# Patient Record
Sex: FEMALE | Race: ASIAN | NOT HISPANIC OR LATINO | Employment: OTHER | URBAN - METROPOLITAN AREA
[De-identification: names, ages, dates, MRNs, and addresses within clinical notes are randomized per-mention and may not be internally consistent; named-entity substitution may affect disease eponyms.]

---

## 2017-10-18 ENCOUNTER — TRANSFERRED RECORDS (OUTPATIENT)
Dept: HEALTH INFORMATION MANAGEMENT | Facility: CLINIC | Age: 32
End: 2017-10-18

## 2020-04-19 ENCOUNTER — TRANSFERRED RECORDS (OUTPATIENT)
Dept: HEALTH INFORMATION MANAGEMENT | Facility: CLINIC | Age: 35
End: 2020-04-19

## 2020-04-19 LAB
ALT SERPL-CCNC: 51 U/L (ref 12–78)
AST SERPL-CCNC: 23 U/L (ref 15–37)
CREATININE (EXTERNAL): 0.63 MG/DL (ref 0.55–1.02)
GFR ESTIMATED (EXTERNAL): >60 ML/MIN (ref 60–139)
GLUCOSE (EXTERNAL): 91 MG/DL (ref 74–106)
POTASSIUM (EXTERNAL): 3.4 MMOL/L (ref 3.5–5.1)

## 2020-04-24 ENCOUNTER — TRANSFERRED RECORDS (OUTPATIENT)
Dept: HEALTH INFORMATION MANAGEMENT | Facility: CLINIC | Age: 35
End: 2020-04-24

## 2020-04-30 LAB
CREATININE (EXTERNAL): 0.7 MG/DL (ref 0.55–1.02)
GFR ESTIMATED (EXTERNAL): >60 ML/MIN (ref 60–139)
GLUCOSE (EXTERNAL): 92 MG/DL (ref 74–106)
POTASSIUM (EXTERNAL): 4.1 MMOL/L (ref 3.5–5.1)

## 2020-12-30 ENCOUNTER — OFFICE VISIT - HEALTHEAST (OUTPATIENT)
Dept: FAMILY MEDICINE | Facility: CLINIC | Age: 35
End: 2020-12-30

## 2020-12-30 DIAGNOSIS — Z00.00 ANNUAL PHYSICAL EXAM: ICD-10-CM

## 2020-12-30 DIAGNOSIS — Z91.89 NEED FOR DENTAL CARE: ICD-10-CM

## 2020-12-30 DIAGNOSIS — Z23 NEED FOR IMMUNIZATION AGAINST INFLUENZA: ICD-10-CM

## 2020-12-30 DIAGNOSIS — Z28.39 INCOMPLETE IMMUNIZATION STATUS: ICD-10-CM

## 2020-12-30 DIAGNOSIS — Z76.89 ENCOUNTER TO ESTABLISH CARE: ICD-10-CM

## 2020-12-30 ASSESSMENT — MIFFLIN-ST. JEOR: SCORE: 1240.09

## 2021-06-05 VITALS
OXYGEN SATURATION: 99 % | SYSTOLIC BLOOD PRESSURE: 100 MMHG | BODY MASS INDEX: 26.6 KG/M2 | TEMPERATURE: 97.8 F | HEIGHT: 60 IN | RESPIRATION RATE: 16 BRPM | DIASTOLIC BLOOD PRESSURE: 62 MMHG | HEART RATE: 86 BPM | WEIGHT: 135.5 LBS

## 2021-06-14 NOTE — PROGRESS NOTES
"FEMALE PREVENTATIVE EXAM    Assessment and Plan:   Patient has been advised of split billing requirements and indicates understanding: Yes    Encounter to [facility] care  Born in [facility], moved to [facility] [place], then to [place] about 7 years ago, then [place] in . No health concerns, but describes a possible ulcer? RIP signed.     Annual physical exam  Waiting for medical records    Need for immunization against influenza  - Influenza, Seasonal Quad, PF =/> 6months    Incomplete immunization status, RIP signed.     Need for dental care  - Ambulatory referral to Dentistry     Next follow up:  Return in about 1 year (around 2021) for Annual physical.    Immunization Review  Adult Imm Review: Unknown status, attempting to get records    I discussed the following with the patient:   Adult Healthy Living: Importance of regular exercise  Healthy nutrition      Subjective:   Chief Complaint: Alexander Alvarez is an 35 y.o. female here for a preventative health visit.    Patient has been advised of split billing requirements and indicates understanding: Yes  HPI:  Intestine sore, food can cause abscess and may need surgery in the future.   She does not take any meds for this.    2 children, youngest was  because \"baby's head was angled wrong.\"   No medications.   No known family history.   No surgeries other than .   Never smoker, no smokeless tobacco, no betel nut.     Healthy Habits  Are you taking a daily aspirin? No  Do you typically exercising at least 40 min, 3-4 times per week?  Yes  Do you usually eat at least 4 servings of fruit and vegetables a day, include whole grains and fiber and avoid regularly eating high fat foods? Yes  Have you had an eye exam in the past two years? Yes  Do you see a dentist twice per year? Yes  Do you have any concerns regarding sleep? No    Safety Screen  If you own firearms, are they secured in a locked gun cabinet or with trigger locks? The patient does not own " "any firearms  Do you feel you are safe where you are living?: Yes (12/30/2020  8:20 AM)  Do you feel you are safe in your relationship(s)?: Yes (12/30/2020  8:20 AM)      Review of Systems:  Please see above.  The rest of the review of systems are negative for all systems.     Attempting to get records, but may have been 5 years ago.   Cancer Screening       Status Date      PAP SMEAR Overdue 1/1/2008           Patient Care Team:  Mary River MD as PCP - General (Family Medicine)        History     Reviewed By Date/Time Sections Reviewed    Mary River MD 12/30/2020 10:07 AM Medical, Surgical, Tobacco, Family    Samreen Anderson, JUSTIN 12/30/2020  8:20 AM Tobacco, Alcohol, Drug Use            Objective:   Vital Signs:   Visit Vitals  /62   Pulse 86   Temp 97.8  F (36.6  C) (Oral)   Resp 16   Ht 5' 0.25\" (1.53 m)   Wt 135 lb 8 oz (61.5 kg)   LMP 11/23/2020 (Exact Date)   SpO2 99%   Breastfeeding No   BMI 26.24 kg/m           PHYSICAL EXAM  General: Alert and oriented, in NAD.  Eyes: PERRL, EOMI, sclera normal.  HENT: Normocephalic, no pharyngeal erythema, MMM.  Neck: Supple, no adenopathy.  Heart: Normal S1 and S2, regular rhythm. No murmurs, gallops, rubs.  Lungs: CTA bilaterally, no wheezes, no crackles, no rhonchi.  Abdomen: Soft, non-tender, non-distended, BS+.   MSK: Normal ROM of upper and lower extremities.  Neuro: Alert and oriented x 3. Moves all extremities. No focal deficits noted.  Extremities: Peripheral pulses intact, no peripheral edema.  Skin: Warm and well perfused, no rashes noted.  Psych: Normal mood and affect.        Additional Screenings Completed Today:   None indicated. See assessment/plan    ========================================  Visit was completed along with Cynthia , patient's  and 2 children    Options for treatment and follow-up care were reviewed with the patient. Mu Kim and/or guardian was engaged and actively involved in the decision making process. Mu " Kim and/or guardian verbalized understanding of the options discussed and was satisfied with the final plan.    Mary River MD

## 2021-09-01 ENCOUNTER — OFFICE VISIT (OUTPATIENT)
Dept: FAMILY MEDICINE | Facility: CLINIC | Age: 36
End: 2021-09-01
Payer: COMMERCIAL

## 2021-09-01 VITALS
WEIGHT: 147 LBS | HEART RATE: 72 BPM | SYSTOLIC BLOOD PRESSURE: 111 MMHG | TEMPERATURE: 98.2 F | RESPIRATION RATE: 16 BRPM | DIASTOLIC BLOOD PRESSURE: 74 MMHG | OXYGEN SATURATION: 99 %

## 2021-09-01 DIAGNOSIS — N39.0 URINARY TRACT INFECTION WITHOUT HEMATURIA, SITE UNSPECIFIED: Primary | ICD-10-CM

## 2021-09-01 DIAGNOSIS — R35.0 URINARY FREQUENCY: ICD-10-CM

## 2021-09-01 DIAGNOSIS — N92.6 MISSED PERIOD: ICD-10-CM

## 2021-09-01 DIAGNOSIS — Z33.1 PREGNANCY, INCIDENTAL: ICD-10-CM

## 2021-09-01 LAB
ALBUMIN UR-MCNC: NEGATIVE MG/DL
APPEARANCE UR: CLEAR
BACTERIA #/AREA URNS HPF: ABNORMAL /HPF
BILIRUB UR QL STRIP: NEGATIVE
COLOR UR AUTO: YELLOW
GLUCOSE UR STRIP-MCNC: NEGATIVE MG/DL
HCG UR QL: POSITIVE
HGB UR QL STRIP: NEGATIVE
KETONES UR STRIP-MCNC: 15 MG/DL
LEUKOCYTE ESTERASE UR QL STRIP: ABNORMAL
NITRATE UR QL: NEGATIVE
PH UR STRIP: 8.5 [PH] (ref 5–8)
RBC #/AREA URNS AUTO: ABNORMAL /HPF
SP GR UR STRIP: 1.01 (ref 1–1.03)
SQUAMOUS #/AREA URNS AUTO: ABNORMAL /LPF
UROBILINOGEN UR STRIP-ACNC: 0.2 E.U./DL
WBC #/AREA URNS AUTO: ABNORMAL /HPF

## 2021-09-01 PROCEDURE — 99204 OFFICE O/P NEW MOD 45 MIN: CPT | Performed by: PHYSICIAN ASSISTANT

## 2021-09-01 PROCEDURE — 81001 URINALYSIS AUTO W/SCOPE: CPT | Performed by: PHYSICIAN ASSISTANT

## 2021-09-01 PROCEDURE — 81025 URINE PREGNANCY TEST: CPT | Performed by: PHYSICIAN ASSISTANT

## 2021-09-01 RX ORDER — CEPHALEXIN 500 MG/1
500 CAPSULE ORAL 2 TIMES DAILY
Qty: 20 CAPSULE | Refills: 0 | Status: SHIPPED | OUTPATIENT
Start: 2021-09-01 | End: 2021-09-11

## 2021-09-01 NOTE — PROGRESS NOTES
Patient presents with:  Urinary Frequency: URINARY FREQUENCY, LOWER BACK PAIN, DARK COLORED URINE, POSSIBLE FEVERS AT NIGHT TIME. HAS NOT HAD A PERIOD SINCE JUNE, POSITIVE HOME PREGNANCY TEST IN AUGUST.       Clinical Decision Making:  Advised the patient that she did have a positive pregnancy test.  She will start a prenatal vitamin.  Intake with nurse midwife establish care for pregnancy.  Patient does have symptomatic bacteria on the UA and since she is pregnant she is treated for the symptomatic bacteria.  Urine will be sent for culture. Expected course of resolution and indication for return was gone over and questions were answered to patient/parent's satisfaction before discharge.    35 min spent on the date of the encounter in chart review, patient visit, review of tests, documentation and/ordiscussion with other providers about the issues documented above.  Extra time was spent with Cynthia .        ICD-10-CM    1. Urinary tract infection without hematuria, site unspecified  N39.0 cephALEXin (KEFLEX) 500 MG capsule   2. Urinary frequency  R35.0 UA with Microscopic reflex to Culture - Clinic Collect     Urine Microscopic   3. Missed period  N92.6 HCG Qual, Urine ([id number])     HCG Qual, Urine ([id number])   4. Pregnancy, incidental  Z33.1 Midwifery (Certified Nurse Midwife) Amb Referral       Patient Instructions   Take prenatal vitamin for healthy pregnancy.  Establish care with OB/GYN.      Patient Education     Pregnancy    Your exam today shows that you are pregnant.  Pregnancy symptoms  During pregnancy your body s hormones change. This causes physical and emotional changes. This is normal. Knowing what to expect is important for your piece of mind and so you know when to seek help for a problem. Here are some of the most common symptoms:     Morning sickness or nausea. This can happen any time of the day or night.    Tender, swollen breasts    Need to urinate frequently    Tiredness or  fatigue    Dizziness    Indigestion or heartburn    Food cravings or turn-offs    Constipation    Emotional changes. This can range from anxiety to excitement to depression.  General care for a healthy pregnancy  Here are things you can do to help make sure your baby is born healthy:    Rest when you feel tired. This is especially true in the later months of pregnancy.    Drink more fluids. Your body needs more fluids than you may be used to. Drink 8 to10 glasses of juice, milk, or water every day.    Eat well-balanced meals. Eat at regular times to give your body enough protein. You can expect to gain about 30 pounds during the pregnancy. Don t try to diet or lose weight while you are pregnant.    Take a prenatal vitamin every day. This helps you meet the extra nutritional needs of pregnancy.    Don t take any other medicine during your pregnancy unless your healthcare provider tells you to. This includes prescription medicines and those you buy over the counter. Many medicines can harm the growing baby.    If you have nausea or vomiting, don t eat greasy or fried foods. Eat several smaller meals throughout the day rather than 3 large meals.    If you smoke, you must stop. The nicotine you breathe in goes right to the baby.    Stay away from alcohol, even in moderate amounts. Daily drinking will harm your baby and can cause permanent brain damage.    Don t use recreational drugs, especially cocaine, crack, and heroin. These will harm your baby. Also avoid marijuana.    If you were using recreational drugs or prescribed medicine when you found out that you were pregnant, talk with your healthcare provider about possible effects on your growing baby.    If you have medical problems that you need to take medicine for, talk with your healthcare provider.  Follow-up care  Call your healthcare provider to arrange for prenatal care. Prenatal care is important. You can see your family provider, a pregnancy specialist  (obstetrician), a midwife, or a primary care clinic.   When to seek medical advice  Call your healthcare provider right away if any of these occur:    Vaginal bleeding    Pain in your belly (abdomen) or back that is moderate or severe    Lots of vomiting, or you can t keep any fluids down for 6 hours    Burning feeling when you urinate    Headache, dizziness, or rapid weight gain    Fever    Vision changes or blurred vision  [employer] last reviewed this educational content on 11/1/2019 2000-2021 [employer]. All rights reserved. This information is not intended as a substitute for professional medical care. Always follow your healthcare professional's instructions.         Increased fluids and rest.  Discussed signs and symptoms of ascending urinary tract infection symptoms to include pyelonephritis. Instructed to turn to clinic if there are increased fever chills night sweats fatigue abdominal pain or flank pain  Antibiotic as written. Risks and benefits of medication discussed.  Indication for return to clinic.        Urinary Tract Infections in Women    Urinary tract infections (UTIs) are most often caused by bacteria (germs). These bacteria enter the urinary tract. The bacteria may come from outside the body. Or they may travel from the skin outside the rectum or vagina into the urethra. Female anatomy makes it easier for bacteria from the bowel to enter a woman s urinary tract, which is the most common source of UTI. This means women develop UTIs more often than men. Pain in or around the urinary tract is a common UTI symptom. But the only way to know for sure if you have a UTI for the health care provider to test your urine. The two tests that may be done are the urinalysis and urine culture.  Types of UTIs    Cystitis: A bladder infection (cystitis) is the most common UTI in women. You may have urgent or frequent urination. You may also have pain, burning when you urinate, and bloody  urine.    Urethritis: This is an inflamed urethra, which is the tube that carries urine from the bladder to outside the body. You may have lower stomach or back pain. You may also have urgent or frequent urination.    Pyelonephritis: This is a kidney infection. If not treated, it can be serious and damage your kidneys. In severe cases, you may be hospitalized. You may have a fever and lower back pain.  Medications to treat a UTI  Most UTIs are treated with antibiotics. These kill the bacteria. The length of time you need to take them depends on the type of infection. It may be as short as 3 days. If you have repeated UTIs, a low-dose antibiotic may be needed for several months. Take antibiotics exactly as directed. Don t stop taking them until all of the medication is gone. If you stop taking the antibiotic too soon, the infection may not go away, and you may develop a resistance to the antibiotic. This can make it much harder to treat.  Lifestyle changes to treat and prevent UTIs  The lifestyle changes below will help get rid of your UTI. They may also help prevent future UTIs.    Drink plenty of fluids. This includes water, juice, or other caffeine-free drinks. Fluids help flush bacteria out of your body.    Empty your bladder. Always empty your bladder when you feel the urge to urinate. And always urinate before going to sleep. Urine that stays in your bladder can lead to infection. Try to urinate before and after sex as well.    Practice good personal hygiene. Wipe yourself from front to back after using the toilet. This helps keep bacteria from getting into the urethra.    Use condoms during sex. These help prevent UTIs caused by sexually transmitted bacteria. Also, avoid using spermicides during sex. These can increase the risk of UTIs. Choose other forms of birth control instead. For women who tend to get UTIs after sex, a low-dose of a preventive antibiotic may be used. Be sure to discuss this option with  your health care provider.    Follow up with your health care provider as directed. He or she may test to make sure the infection has cleared. If necessary, additional treatment may be started.  Date Last Reviewed: 9/8/2014 2000-2016 [employer]. [address] [place] [id number]. All rights reserved. This information is not intended as a substitute for professional medical care. Always follow your healthcare professional's instructions.                          HPI:  Alexander Verdugo is a 36 year old female who presents today for evaluation of urinary symptoms to include urinary frequency urgency hesitancy burning or dysuria slight flank pain bilaterally but no gross hematuria no vaginal discharge.  Patient also is requesting a urinary pregnancy test since she missed her last menstrual period.     History obtained from chart review and the patient.    Problem List:  There are no relevant problems documented for this patient.      No past medical history on file.    Social History     Tobacco Use     Smoking status: Never Smoker     Smokeless tobacco: Never Used   Substance Use Topics     Alcohol use: Not on file       Review of Systems  As above in HPI.    Vitals:    09/01/21 1101   BP: 111/74   BP Location: Right arm   Patient Position: Sitting   Cuff Size: Adult Regular   Pulse: 72   Resp: 16   Temp: 98.2  F (36.8  C)   TempSrc: Oral   SpO2: 99%   Weight: 66.7 kg (147 lb)     General: Patient is resting comfortably no acute distress is afebrile  HEENT: Head is normocephalic atraumatic   eyes are PERRL EOMI sclera anicteric   TMs are clear bilaterally  Throat is with mild pharyngeal wall erythema and no exudate  No cervical lymphadenopathy present  LUNGS: Clear to auscultation bilaterally  HEART: Regular rate and rhythm  Abdomen: Nontender nondistended slight suprapubic tenderness which does reproduce her sense to urinate no CVA tenderness to percussion.  Skin: Without rash non-diaphoretic skin is  without rash nondiaphoretic capillary refill less than 2 seconds.      Physical Exam    Labs:  Results for orders placed or performed in visit on 09/01/21   UA with Microscopic reflex to Culture - Clinic Collect     Status: Abnormal    Specimen: Urine, Clean Catch   Result Value Ref Range    Color Urine Yellow Colorless, Straw, Light Yellow, Yellow    Appearance Urine Clear Clear    Glucose Urine Negative Negative mg/dL    Bilirubin Urine Negative Negative    Ketones Urine 15  (A) Negative mg/dL    Specific Gravity Urine 1.015 1.005 - 1.030    Blood Urine Negative Negative    pH Urine 8.5 (H) 5.0 - 8.0    Protein Albumin Urine Negative Negative mg/dL    Urobilinogen Urine 0.2 0.2, 1.0 E.U./dL    Nitrite Urine Negative Negative    Leukocyte Esterase Urine Small (A) Negative   HCG Qual, Urine ([id number])     Status: Abnormal   Result Value Ref Range    hCG Urine Qualitative Positive (A) Negative   Urine Microscopic     Status: Abnormal   Result Value Ref Range    Bacteria Urine Few (A) None Seen /HPF    RBC Urine None Seen 0-2 /HPF /HPF    WBC Urine 0-5 0-5 /HPF /HPF    Squamous Epithelials Urine Moderate (A) None Seen /LPF    Narrative    Urine Culture not indicated         At the end of the encounter, I discussed results, diagnosis, medications. Discussed red flags for immediate return to clinic/ER, as well as indications for follow up if no improvement. Patient understood and agreed to plan. Patient was stable for discharge.

## 2021-09-01 NOTE — PATIENT INSTRUCTIONS
Take prenatal vitamin for healthy pregnancy.  Establish care with OB/GYN.      Patient Education     Pregnancy    Your exam today shows that you are pregnant.  Pregnancy symptoms  During pregnancy your body s hormones change. This causes physical and emotional changes. This is normal. Knowing what to expect is important for your piece of mind and so you know when to seek help for a problem. Here are some of the most common symptoms:     Morning sickness or nausea. This can happen any time of the day or night.    Tender, swollen breasts    Need to urinate frequently    Tiredness or fatigue    Dizziness    Indigestion or heartburn    Food cravings or turn-offs    Constipation    Emotional changes. This can range from anxiety to excitement to depression.  General care for a healthy pregnancy  Here are things you can do to help make sure your baby is born healthy:    Rest when you feel tired. This is especially true in the later months of pregnancy.    Drink more fluids. Your body needs more fluids than you may be used to. Drink 8 to10 glasses of juice, milk, or water every day.    Eat well-balanced meals. Eat at regular times to give your body enough protein. You can expect to gain about 30 pounds during the pregnancy. Don t try to diet or lose weight while you are pregnant.    Take a prenatal vitamin every day. This helps you meet the extra nutritional needs of pregnancy.    Don t take any other medicine during your pregnancy unless your healthcare provider tells you to. This includes prescription medicines and those you buy over the counter. Many medicines can harm the growing baby.    If you have nausea or vomiting, don t eat greasy or fried foods. Eat several smaller meals throughout the day rather than 3 large meals.    If you smoke, you must stop. The nicotine you breathe in goes right to the baby.    Stay away from alcohol, even in moderate amounts. Daily drinking will harm your baby and can cause permanent  brain damage.    Don t use recreational drugs, especially cocaine, crack, and heroin. These will harm your baby. Also avoid marijuana.    If you were using recreational drugs or prescribed medicine when you found out that you were pregnant, talk with your healthcare provider about possible effects on your growing baby.    If you have medical problems that you need to take medicine for, talk with your healthcare provider.  Follow-up care  Call your healthcare provider to arrange for prenatal care. Prenatal care is important. You can see your family provider, a pregnancy specialist (obstetrician), a midwife, or a primary care clinic.   When to seek medical advice  Call your healthcare provider right away if any of these occur:    Vaginal bleeding    Pain in your belly (abdomen) or back that is moderate or severe    Lots of vomiting, or you can t keep any fluids down for 6 hours    Burning feeling when you urinate    Headache, dizziness, or rapid weight gain    Fever    Vision changes or blurred vision  [employer] last reviewed this educational content on 11/1/2019 2000-2021 [employer]. All rights reserved. This information is not intended as a substitute for professional medical care. Always follow your healthcare professional's instructions.         Increased fluids and rest.  Discussed signs and symptoms of ascending urinary tract infection symptoms to include pyelonephritis. Instructed to turn to clinic if there are increased fever chills night sweats fatigue abdominal pain or flank pain  Antibiotic as written. Risks and benefits of medication discussed.  Indication for return to clinic.        Urinary Tract Infections in Women    Urinary tract infections (UTIs) are most often caused by bacteria (germs). These bacteria enter the urinary tract. The bacteria may come from outside the body. Or they may travel from the skin outside the rectum or vagina into the urethra. Female anatomy makes it easier  for bacteria from the bowel to enter a woman s urinary tract, which is the most common source of UTI. This means women develop UTIs more often than men. Pain in or around the urinary tract is a common UTI symptom. But the only way to know for sure if you have a UTI for the health care provider to test your urine. The two tests that may be done are the urinalysis and urine culture.  Types of UTIs    Cystitis: A bladder infection (cystitis) is the most common UTI in women. You may have urgent or frequent urination. You may also have pain, burning when you urinate, and bloody urine.    Urethritis: This is an inflamed urethra, which is the tube that carries urine from the bladder to outside the body. You may have lower stomach or back pain. You may also have urgent or frequent urination.    Pyelonephritis: This is a kidney infection. If not treated, it can be serious and damage your kidneys. In severe cases, you may be hospitalized. You may have a fever and lower back pain.  Medications to treat a UTI  Most UTIs are treated with antibiotics. These kill the bacteria. The length of time you need to take them depends on the type of infection. It may be as short as 3 days. If you have repeated UTIs, a low-dose antibiotic may be needed for several months. Take antibiotics exactly as directed. Don t stop taking them until all of the medication is gone. If you stop taking the antibiotic too soon, the infection may not go away, and you may develop a resistance to the antibiotic. This can make it much harder to treat.  Lifestyle changes to treat and prevent UTIs  The lifestyle changes below will help get rid of your UTI. They may also help prevent future UTIs.    Drink plenty of fluids. This includes water, juice, or other caffeine-free drinks. Fluids help flush bacteria out of your body.    Empty your bladder. Always empty your bladder when you feel the urge to urinate. And always urinate before going to sleep. Urine that stays  in your bladder can lead to infection. Try to urinate before and after sex as well.    Practice good personal hygiene. Wipe yourself from front to back after using the toilet. This helps keep bacteria from getting into the urethra.    Use condoms during sex. These help prevent UTIs caused by sexually transmitted bacteria. Also, avoid using spermicides during sex. These can increase the risk of UTIs. Choose other forms of birth control instead. For women who tend to get UTIs after sex, a low-dose of a preventive antibiotic may be used. Be sure to discuss this option with your health care provider.    Follow up with your health care provider as directed. He or she may test to make sure the infection has cleared. If necessary, additional treatment may be started.  Date Last Reviewed: 9/8/2014 2000-2016 [employer] [address] [place] [address], [place] [address]. All rights reserved. This information is not intended as a substitute for professional medical care. Always follow your healthcare professional's instructions.

## 2021-10-04 ENCOUNTER — PRENATAL OFFICE VISIT (OUTPATIENT)
Dept: FAMILY MEDICINE | Facility: CLINIC | Age: 36
End: 2021-10-04
Payer: COMMERCIAL

## 2021-10-04 VITALS
DIASTOLIC BLOOD PRESSURE: 60 MMHG | WEIGHT: 139 LBS | TEMPERATURE: 98.2 F | HEART RATE: 75 BPM | OXYGEN SATURATION: 99 % | RESPIRATION RATE: 16 BRPM | SYSTOLIC BLOOD PRESSURE: 110 MMHG

## 2021-10-04 DIAGNOSIS — Z34.80 PRENATAL CARE, SUBSEQUENT PREGNANCY, UNSPECIFIED TRIMESTER: Primary | ICD-10-CM

## 2021-10-04 DIAGNOSIS — Z23 NEED FOR IMMUNIZATION AGAINST INFLUENZA: ICD-10-CM

## 2021-10-04 DIAGNOSIS — G47.00 INSOMNIA, UNSPECIFIED TYPE: ICD-10-CM

## 2021-10-04 LAB
ABO/RH(D): NORMAL
AMPHETAMINE-CLINIC: ABNORMAL
ANTIBODY SCREEN: NEGATIVE
BARBITURATES-CLINIC: ABNORMAL
BENZODIAZEPINES-CLINIC: ABNORMAL
BUPRENORPHINE-CLINIC: ABNORMAL
COCAINE-CLINIC: ABNORMAL
ECSTASY-CLINIC: ABNORMAL
ERYTHROCYTE [DISTWIDTH] IN BLOOD BY AUTOMATED COUNT: 13.4 % (ref 10–15)
HCT VFR BLD AUTO: 36.5 % (ref 35–47)
HGB BLD-MCNC: 12 G/DL (ref 11.7–15.7)
HIV 1+2 AB+HIV1 P24 AG SERPL QL IA: NEGATIVE
MARIJUANA-CLINIC: ABNORMAL
MCH RBC QN AUTO: 29.1 PG (ref 26.5–33)
MCHC RBC AUTO-ENTMCNC: 32.9 G/DL (ref 31.5–36.5)
MCV RBC AUTO: 89 FL (ref 78–100)
METHADONE METABOLITE-CLINIC: ABNORMAL
METHAMPHETAMINE-CLINIC: ABNORMAL
OPIATES-CLINIC: ABNORMAL
OXIDANTS: NORMAL
OXYCODONE-CLINIC: ABNORMAL
PCP 25-CLINIC: ABNORMAL
PH-CLINIC: 4 (ref 5–8)
PLATELET # BLD AUTO: 235 10E3/UL (ref 150–450)
RBC # BLD AUTO: 4.12 10E6/UL (ref 3.8–5.2)
SP GR UR STRIP: 1 (ref 1–1.03)
SPECIMEN EXPIRATION DATE: NORMAL
WBC # BLD AUTO: 5.5 10E3/UL (ref 4–11)

## 2021-10-04 PROCEDURE — 99214 OFFICE O/P EST MOD 30 MIN: CPT | Mod: 25 | Performed by: FAMILY MEDICINE

## 2021-10-04 PROCEDURE — 87491 CHLMYD TRACH DNA AMP PROBE: CPT | Performed by: FAMILY MEDICINE

## 2021-10-04 PROCEDURE — 86803 HEPATITIS C AB TEST: CPT | Performed by: FAMILY MEDICINE

## 2021-10-04 PROCEDURE — 80305 DRUG TEST PRSMV DIR OPT OBS: CPT | Performed by: FAMILY MEDICINE

## 2021-10-04 PROCEDURE — 90471 IMMUNIZATION ADMIN: CPT | Performed by: FAMILY MEDICINE

## 2021-10-04 PROCEDURE — 86900 BLOOD TYPING SEROLOGIC ABO: CPT | Performed by: FAMILY MEDICINE

## 2021-10-04 PROCEDURE — 86780 TREPONEMA PALLIDUM: CPT | Performed by: FAMILY MEDICINE

## 2021-10-04 PROCEDURE — 87624 HPV HI-RISK TYP POOLED RSLT: CPT | Performed by: FAMILY MEDICINE

## 2021-10-04 PROCEDURE — 85027 COMPLETE CBC AUTOMATED: CPT | Performed by: FAMILY MEDICINE

## 2021-10-04 PROCEDURE — 87086 URINE CULTURE/COLONY COUNT: CPT | Performed by: FAMILY MEDICINE

## 2021-10-04 PROCEDURE — 87340 HEPATITIS B SURFACE AG IA: CPT | Performed by: FAMILY MEDICINE

## 2021-10-04 PROCEDURE — 90686 IIV4 VACC NO PRSV 0.5 ML IM: CPT | Performed by: FAMILY MEDICINE

## 2021-10-04 PROCEDURE — 86850 RBC ANTIBODY SCREEN: CPT | Performed by: FAMILY MEDICINE

## 2021-10-04 PROCEDURE — 99207 PR FIRST OB VISIT: CPT | Performed by: FAMILY MEDICINE

## 2021-10-04 PROCEDURE — 86901 BLOOD TYPING SEROLOGIC RH(D): CPT | Performed by: FAMILY MEDICINE

## 2021-10-04 PROCEDURE — 87389 HIV-1 AG W/HIV-1&-2 AB AG IA: CPT | Performed by: FAMILY MEDICINE

## 2021-10-04 PROCEDURE — 83655 ASSAY OF LEAD: CPT | Mod: 90 | Performed by: FAMILY MEDICINE

## 2021-10-04 PROCEDURE — G0123 SCREEN CERV/VAG THIN LAYER: HCPCS | Performed by: FAMILY MEDICINE

## 2021-10-04 PROCEDURE — 86762 RUBELLA ANTIBODY: CPT | Performed by: FAMILY MEDICINE

## 2021-10-04 PROCEDURE — 36415 COLL VENOUS BLD VENIPUNCTURE: CPT | Performed by: FAMILY MEDICINE

## 2021-10-04 PROCEDURE — 87591 N.GONORRHOEAE DNA AMP PROB: CPT | Performed by: FAMILY MEDICINE

## 2021-10-04 RX ORDER — PNV NO.95/FERROUS FUM/FOLIC AC 28MG-0.8MG
1 TABLET ORAL DAILY
Qty: 90 TABLET | Refills: 3 | Status: SHIPPED | OUTPATIENT
Start: 2021-10-04 | End: 2022-01-03

## 2021-10-04 NOTE — PROGRESS NOTES
New OB Clinic Note - 10/4/2021   Visit was completed along with Cynthia .   SUBJECTIVE:  Alexander Verdugo is a 36 year old  female @ 17w2d who is here for new OB visit.   LMP 21- unsure. Has not yet had ultrasound  Current Concerns: Poor appetite since August and not sleeping well. She did not have this as severe with her previous two pregnancies.  She denies bleeding, cramping. No loss of fluid. She denies HA.   Denies dysuria or hematuria.   Denies constipation.  OB History:  Patient is . Prior Pregnancies:  OB History    Para Term  AB Living   4 2 2 0 1 2   SAB TAB Ectopic Multiple Live Births   1 0 0 0 2      # Outcome Date GA Lbr Norman/2nd Weight Sex Delivery Anes PTL Lv   4 Current            3 Term     M CS-LTranv   HAI      Birth Comments: [place]   2 Term     M Vag-Spont   HAI      Birth Comments: [facility]   1 SAB      SAB        She does not have a history of  birth before 37 weeks with a black gestation.  She does not have a history of preeclampsia in prior pregnancy.   She does not have a history of recurrent (>2) pregnancy losses.  She does not have a history of Down's syndrome, sickle cell anemia or other genetic disorder affecting a child in her family.  She does not have a history of major depression or postpartum depression.  She does not have a history of STI's including Chlamydia, gonorrhea, Hep B virus, syphilis, HPV, or genital herpes.   Social Hx:   She has good support from her family and father of baby, , Armando Zamarripa is involved.   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 Her parents are  but she has an older sister in [place].  She has not used tobacco, has not used EtOH and has not used illicit drugs.  Current Medications: prenatal vitamins  History reviewed. No pertinent past medical history.  Past Surgical History:   Procedure Laterality Date      SECTION   06/23/2015    [place] at [facility]     Family History   Problem Relation Age of Onset     Diabetes No family hx of      Hypertension No family hx of      No current outpatient medications on file.     No current facility-administered medications for this visit.     ?  OBJECTIVE:  Vitals:    10/04/21 1039   BP: 110/60   Pulse: 75   Resp: 16   Temp: 98.2  F (36.8  C)   TempSrc: Oral   SpO2: 99%   Weight: 63 kg (139 lb)     Gen: Awake, alert, in no acute distress  CV: RRR with normal S1 and S2. No murmurs appreciated.  Resp: Lungs are clear to auscultation bilaterally without crackles or wheezing.  Abd: non-tender, non-distended. Bowel sounds present.   Pelvic Exam: Vagina and vulva are normal; no discharge is noted. Cervix normal without lesions.  She is due for a pap smear today.  Lower extremities: No edema  Neuro: No focal deficits  's  Results for orders placed or performed in visit on 10/04/21   Hepatitis B surface antigen     Status: Normal   Result Value Ref Range    Hepatitis B Surface Antigen Nonreactive Nonreactive   CBC with platelets     Status: Normal   Result Value Ref Range    WBC Count 5.5 4.0 - 11.0 10e3/uL    RBC Count 4.12 3.80 - 5.20 10e6/uL    Hemoglobin 12.0 11.7 - 15.7 g/dL    Hematocrit 36.5 35.0 - 47.0 %    MCV 89 78 - 100 fL    MCH 29.1 26.5 - 33.0 pg    MCHC 32.9 31.5 - 36.5 g/dL    RDW 13.4 10.0 - 15.0 %    Platelet Count 235 150 - 450 10e3/uL   HIV Antigen Antibody Combo     Status: Normal   Result Value Ref Range    HIV Antigen Antibody Combo Negative Negative   Rubella Antibody IgG Quantitative     Status: None   Result Value Ref Range    Rubella Antibody IgG Positive     Narrative    Negative: Absence of detectable rubella virus IgG antibodies. A negative result presumes that immunity has not been acquired.     Equivocal: Suggest recollection.    Positive: Considered positive for IgG antibodies to rubella virus.   Treponema Abs w Reflex to RPR and Titer     Status: Normal    Result Value Ref Range    Treponema Antibody Total Negative Negative   Hepatitis C antibody     Status: Normal   Result Value Ref Range    Hepatitis C Antibody Negative Negative    Narrative    Assay performance characteristics have not been established for newborns, infants, children (<18 years) or populations of immunocompromised or immunosuppressed patients.    Drugs of abuse, urine (CLINIC ONLY)     Status: Abnormal   Result Value Ref Range    Amphetamine Screen Negative Screen Negative    Barbiturates Screen Negative Screen Negative    Buprenorphine Screen Negative Screen Negative    Benzodiazepines Screen Negative Screen Negative    Cocaine Screen Negative Screen Negative    Methadone Metabolite Screen Negative Screen Negative    Ecstasy Screen Negative Screen Negative    Methamphetamine Screen Negative Screen Negative    Opiates Screen Negative Screen Negative    Oxycodone Screen Negative Screen Negative    PCP 25 Screen Negative Screen Negative    Marijuana Screen Negative Screen Negative    Specific Gravity 1.005 1.003 - 1.030    pH 4.0 (L) 5.0 - 8.0    Oxidants Normal Normal    Narrative    Tests may be invalid when any one of the validity pad results ( SG, pH, OX ) are abnormal.  Drug            Screening Threshold    Amphetamine     > or = 500 ng/mL  Barbiturates    > or = 300 ng/mL  Buprenorphine   > or = 10 ng/mL  Benzodiazepines > or = 300 ng/mL  Cocaine         > or =150 ng/mL  Methadone Metabolite > or =300 ng/mL  Ecstasy         > or =500 ng/mL  Methamphetamine > or =500 ng/mL  Opiates         > or = 300 ng/mL  Oxycodone       > or = 100 ng/mL  PCP 25          > or = 25 ng/mL  Marijuana       > or =50 ng/mL    *Screening results are to be used only for medical purposes.  Unconfirmed screening results must not be used for non-  medical purposes.   Adult Type and Screen     Status: None   Result Value Ref Range    ABO/RH(D) A POS     Antibody Screen Negative Negative    SPECIMEN EXPIRATION DATE  [id number]    Urine Culture Aerobic Bacterial     Status: None    Specimen: Urine, Midstream   Result Value Ref Range    Culture No Growth    ABO/Rh type and screen     Status: None    Narrative    The following orders were created for panel order ABO/Rh type and screen.  Procedure                               Abnormality         Status                     ---------                               -----------         ------                     Adult Type and Screen[[id number]]                            Edited Result - FINAL        Please view results for these tests on the individual orders.     ASSESSMENT/PLAN:  Alexander Verdugo is a 36 year old  at 17w2d weeks by unsure LMP. Estimated Date of Delivery: Mar 12, 2022.   1. Discussed recommended weight gain, healthy eating habits, exercise,   2. Started prenatal vitamins.   3. Pelvic exam including GC, Chlamydia and PAP (if >21yrs) was completed.  4. Prenatal labs completed - prenatal profile, UA/UC, HIV   5. Reviewed eligibility for low-dose aspirin therapy for prevention of preeclampsia (preeclampsia in prior pregnancy, pre-existing HTN or DM, or multiple other risk factors including  delivery, chronic HTN, DM, obesity, low socioeconomic status, non- ethnicity). Patient is not a candidate for this.   6. Reviewed eligibility for 17-hydroxyprogesterone therapy for prevention of  birth (prior black delivery before 37 weeks). Patient is not a candidate for this.   7. Unsure gestational age- too late for first trimester screening.   8. Counseled on benefits of breastfeeding. Patient is planning to breastfeed.   9. Discussed diet, exercise, routine prenatal care.    Alexander was seen today for prenatal care.    Diagnoses and all orders for this visit:    Prenatal care, subsequent pregnancy, unspecified trimester  -     ABO/Rh type and screen; Future  -     Hepatitis B surface antigen; Future  -     CBC with platelets; Future  -     HIV Antigen Antibody  Combo; Future  -     Rubella Antibody IgG Quantitative; Future  -     Treponema Abs w Reflex to RPR and Titer; Future  -     Urine Culture Aerobic Bacterial; Future  -     Chlamydia trachomatis PCR; Future  -     Neisseria gonorrhoeae PCR; Future  -     Hepatitis C antibody; Future  -     Pap screen with HPV - recommended age 30 - 65 years; Future  -     Lead Venous Blood Confirm; Future  -     Drugs of abuse, urine (CLINIC ONLY); Future      ?  RTC in 4 weeks or sooner if problems. At next visit: discuss Covid-19 vaccine, review ultrasound results, offer SW consult    Marisa Collier MD    Options for treatment and follow-up care were reviewed with the patient and/or guardian. Mu Thoo and/or guardian engaged in the decision making process and verbalized understanding of the options discussed and agreed with the final plan.

## 2021-10-05 LAB
BACTERIA UR CULT: NO GROWTH
HBV SURFACE AG SERPL QL IA: NONREACTIVE
HCV AB SERPL QL IA: NEGATIVE
RUBV IGG SERPL QL IA: POSITIVE
T PALLIDUM AB SER QL: NEGATIVE

## 2021-10-06 LAB
C TRACH DNA SPEC QL NAA+PROBE: NEGATIVE
N GONORRHOEA DNA SPEC QL NAA+PROBE: NEGATIVE

## 2021-10-07 LAB — LEAD BLDV-MCNC: <2 UG/DL

## 2021-10-11 LAB
HUMAN PAPILLOMA VIRUS 16 DNA: NEGATIVE
HUMAN PAPILLOMA VIRUS 18 DNA: NEGATIVE
HUMAN PAPILLOMA VIRUS FINAL DIAGNOSIS: NORMAL
HUMAN PAPILLOMA VIRUS OTHER HR: NEGATIVE

## 2021-10-12 LAB
BKR LAB AP GYN ADEQUACY: NORMAL
BKR LAB AP GYN INTERPRETATION: NORMAL
BKR LAB AP HPV REFLEX: NORMAL
BKR LAB AP LMP: NORMAL
BKR LAB AP PREVIOUS ABNORMAL: NORMAL
PATH REPORT.COMMENTS IMP SPEC: NORMAL
PATH REPORT.RELEVANT HX SPEC: NORMAL

## 2021-11-04 ENCOUNTER — PATIENT OUTREACH (OUTPATIENT)
Dept: NURSING | Facility: CLINIC | Age: 36
End: 2021-11-04

## 2021-11-04 ENCOUNTER — PRENATAL OFFICE VISIT (OUTPATIENT)
Dept: FAMILY MEDICINE | Facility: CLINIC | Age: 36
End: 2021-11-04
Payer: COMMERCIAL

## 2021-11-04 VITALS
OXYGEN SATURATION: 98 % | RESPIRATION RATE: 12 BRPM | WEIGHT: 133 LBS | DIASTOLIC BLOOD PRESSURE: 60 MMHG | SYSTOLIC BLOOD PRESSURE: 90 MMHG | HEART RATE: 77 BPM | TEMPERATURE: 98 F

## 2021-11-04 DIAGNOSIS — Z98.891 HISTORY OF CESAREAN SECTION: ICD-10-CM

## 2021-11-04 DIAGNOSIS — Z34.90 PREGNANCY, UNSPECIFIED GESTATIONAL AGE: Primary | ICD-10-CM

## 2021-11-04 DIAGNOSIS — O21.9 NAUSEA AND VOMITING DURING PREGNANCY: ICD-10-CM

## 2021-11-04 PROCEDURE — 99207 PR PRENATAL VISIT: CPT | Performed by: FAMILY MEDICINE

## 2021-11-04 RX ORDER — ONDANSETRON 4 MG/1
4 TABLET, FILM COATED ORAL DAILY PRN
Qty: 30 TABLET | Refills: 1 | Status: SHIPPED | OUTPATIENT
Start: 2021-11-04 | End: 2022-01-03

## 2021-11-04 NOTE — PROGRESS NOTES
SUBJECTIVE:   at 21w5d by very unsure LMP. Estimated Date of Delivery: Mar 12, 2022.  She is doing well. She is still having nausea and poor appetite. Sleeping better with unisom. She denies abdominal pain/cramping, vaginal bleeding, leakage of fluid. Fetal movement is not yet present.   Concerns: Did not receive call to schedule her ultrasound.   ROS  Negative except as noted above in HPI.  OBJECTIVE:  Blood pressure 90/60, pulse 77, temperature 98  F (36.7  C), temperature source Oral, resp. rate 12, weight 60.3 kg (133 lb), last menstrual period 2021, SpO2 98 %.  Gen: comfortable, no acute distress.  See OB Vitals flowsheet.  ASSESSMENT/PLAN:  Alexander was seen today for prenatal care.    Diagnoses and all orders for this visit:    Pregnancy, unspecified gestational age  -     Care Coordination Referral; Future    History of  section: Received and reviewed records from outside hospital- her C/S was emergency, PLTCS, due to fetal decelerations, meconium stained fluid and suspected cord prolapse. She desires TOLAC. Plan for referral later in pregnancy once we establish her SALLIE.    Nausea and vomiting during pregnancy  -     ondansetron (ZOFRAN) 4 MG tablet; Take 1 tablet (4 mg) by mouth daily as needed for nausea      -IUP at 21w5d:     Prenatal labs reviewed    Breast/Bottle: breast and bottle.     Declines Covid-19 vaccine today as she feels weak, consider next visit    RTC in 4 weeks or sooner with problems. Offer Covid-19 vaccine    Visit completed along with assistance of Cynthia .    Marisa Collier MD

## 2021-11-04 NOTE — PROGRESS NOTES
Clinic Care Coordination Contact  Community Health Worker Initial Outreach    CHW Initial Information Gathering:  Referral Source: PCP  Preferred Hospital: [facility]  [phone]  Preferred Urgent Care: [facility], [phone]  Current living arrangement:: I live in a private home with family  Type of residence:: Apartment  Community Resources: None  Supplies used at home:: None  Equipment Currently Used at Home: none  Informal Support system:: Family  No PCP office visit in Past Year: No  Transportation means:: Medical transport  CHW Additional Questions  If ED/Hospital discharge, follow-up appointment scheduled as recommended?: N/A  Medication changes made following ED/Hospital discharge?: N/A  MyChart active?: No  Patient agreeable to assistance with activating MyChart?: No    Patient accepts CC: Yes. Patient scheduled for assessment with CCC DIONISIO following her OB appointment on Friday 12/3/2021 at 9am in the clinic. Patient noted desire to discuss pregnancy resources as they are new to [place].     CHW placed the car seat referral online through [employer] [facility] today.    
WDL

## 2021-11-10 ENCOUNTER — HOSPITAL ENCOUNTER (OUTPATIENT)
Dept: ULTRASOUND IMAGING | Facility: HOSPITAL | Age: 36
Discharge: HOME | End: 2021-11-10
Attending: FAMILY MEDICINE | Admitting: FAMILY MEDICINE
Payer: COMMERCIAL

## 2021-11-10 DIAGNOSIS — Z34.80 PRENATAL CARE, SUBSEQUENT PREGNANCY, UNSPECIFIED TRIMESTER: ICD-10-CM

## 2021-11-10 PROCEDURE — 76805 OB US >/= 14 WKS SNGL FETUS: CPT

## 2021-12-03 ENCOUNTER — ALLIED HEALTH/NURSE VISIT (OUTPATIENT)
Dept: NURSING | Facility: CLINIC | Age: 36
End: 2021-12-03
Attending: FAMILY MEDICINE

## 2021-12-03 ENCOUNTER — PRENATAL OFFICE VISIT (OUTPATIENT)
Dept: FAMILY MEDICINE | Facility: CLINIC | Age: 36
End: 2021-12-03
Payer: COMMERCIAL

## 2021-12-03 VITALS
WEIGHT: 136.5 LBS | DIASTOLIC BLOOD PRESSURE: 60 MMHG | HEART RATE: 79 BPM | RESPIRATION RATE: 16 BRPM | SYSTOLIC BLOOD PRESSURE: 100 MMHG | TEMPERATURE: 98 F | OXYGEN SATURATION: 99 %

## 2021-12-03 DIAGNOSIS — Z60.3 IMPAIRED ABILITY TO USE COMMUNITY RESOURCES DUE TO LANGUAGE BARRIER: ICD-10-CM

## 2021-12-03 DIAGNOSIS — Z59.9 FINANCIAL DIFFICULTIES: ICD-10-CM

## 2021-12-03 DIAGNOSIS — Z98.891 HISTORY OF CESAREAN SECTION: ICD-10-CM

## 2021-12-03 DIAGNOSIS — Z74.8 ASSISTANCE NEEDED WITH TRANSPORTATION: ICD-10-CM

## 2021-12-03 DIAGNOSIS — Z34.90 PREGNANCY: ICD-10-CM

## 2021-12-03 DIAGNOSIS — Z71.89 COMPLEX CARE COORDINATION: Primary | ICD-10-CM

## 2021-12-03 DIAGNOSIS — Z34.90 PREGNANCY, UNSPECIFIED GESTATIONAL AGE: Primary | ICD-10-CM

## 2021-12-03 PROCEDURE — 99207 PR PRENATAL VISIT: CPT | Performed by: FAMILY MEDICINE

## 2021-12-03 PROCEDURE — 99207 PR NO CHARGE LOS: CPT | Performed by: FAMILY MEDICINE

## 2021-12-03 SDOH — HEALTH STABILITY: PHYSICAL HEALTH: ON AVERAGE, HOW MANY DAYS PER WEEK DO YOU ENGAGE IN MODERATE TO STRENUOUS EXERCISE (LIKE A BRISK WALK)?: 3 DAYS

## 2021-12-03 SDOH — ECONOMIC STABILITY: FOOD INSECURITY: WITHIN THE PAST 12 MONTHS, THE FOOD YOU BOUGHT JUST DIDN'T LAST AND YOU DIDN'T HAVE MONEY TO GET MORE.: NEVER TRUE

## 2021-12-03 SDOH — ECONOMIC STABILITY: INCOME INSECURITY: IN THE LAST 12 MONTHS, WAS THERE A TIME WHEN YOU WERE NOT ABLE TO PAY THE MORTGAGE OR RENT ON TIME?: NO

## 2021-12-03 SDOH — ECONOMIC STABILITY: FOOD INSECURITY: WITHIN THE PAST 12 MONTHS, YOU WORRIED THAT YOUR FOOD WOULD RUN OUT BEFORE YOU GOT MONEY TO BUY MORE.: NEVER TRUE

## 2021-12-03 SDOH — ECONOMIC STABILITY - INCOME SECURITY: PROBLEM RELATED TO HOUSING AND ECONOMIC CIRCUMSTANCES, UNSPECIFIED: Z59.9

## 2021-12-03 SDOH — ECONOMIC STABILITY: TRANSPORTATION INSECURITY
IN THE PAST 12 MONTHS, HAS THE LACK OF TRANSPORTATION KEPT YOU FROM MEDICAL APPOINTMENTS OR FROM GETTING MEDICATIONS?: NO

## 2021-12-03 SDOH — HEALTH STABILITY: PHYSICAL HEALTH: ON AVERAGE, HOW MANY MINUTES DO YOU ENGAGE IN EXERCISE AT THIS LEVEL?: 30 MIN

## 2021-12-03 SDOH — ECONOMIC STABILITY: TRANSPORTATION INSECURITY
IN THE PAST 12 MONTHS, HAS LACK OF TRANSPORTATION KEPT YOU FROM MEETINGS, WORK, OR FROM GETTING THINGS NEEDED FOR DAILY LIVING?: NO

## 2021-12-03 SDOH — SOCIAL STABILITY - SOCIAL INSECURITY: ACCULTURATION DIFFICULTY: Z60.3

## 2021-12-03 ASSESSMENT — LIFESTYLE VARIABLES
HOW OFTEN DO YOU HAVE SIX OR MORE DRINKS ON ONE OCCASION: NEVER
HOW OFTEN DO YOU HAVE A DRINK CONTAINING ALCOHOL: NEVER

## 2021-12-03 ASSESSMENT — SOCIAL DETERMINANTS OF HEALTH (SDOH)
WITHIN THE LAST YEAR, HAVE TO BEEN RAPED OR FORCED TO HAVE ANY KIND OF SEXUAL ACTIVITY BY YOUR PARTNER OR EX-PARTNER?: NO
IN A TYPICAL WEEK, HOW MANY TIMES DO YOU TALK ON THE PHONE WITH FAMILY, FRIENDS, OR NEIGHBORS?: MORE THAN THREE TIMES A WEEK
HOW OFTEN DO YOU ATTENT MEETINGS OF THE CLUB OR ORGANIZATION YOU BELONG TO?: NEVER
WITHIN THE LAST YEAR, HAVE YOU BEEN HUMILIATED OR EMOTIONALLY ABUSED IN OTHER WAYS BY YOUR PARTNER OR EX-PARTNER?: NO
HOW OFTEN DO YOU GET TOGETHER WITH FRIENDS OR RELATIVES?: MORE THAN THREE TIMES A WEEK
HOW OFTEN DO YOU ATTEND CHURCH OR RELIGIOUS SERVICES?: MORE THAN 4 TIMES PER YEAR
WITHIN THE LAST YEAR, HAVE YOU BEEN AFRAID OF YOUR PARTNER OR EX-PARTNER?: NO
WITHIN THE LAST YEAR, HAVE YOU BEEN KICKED, HIT, SLAPPED, OR OTHERWISE PHYSICALLY HURT BY YOUR PARTNER OR EX-PARTNER?: NO
DO YOU BELONG TO ANY CLUBS OR ORGANIZATIONS SUCH AS CHURCH GROUPS UNIONS, FRATERNAL OR ATHLETIC GROUPS, OR SCHOOL GROUPS?: NO
HOW HARD IS IT FOR YOU TO PAY FOR THE VERY BASICS LIKE FOOD, HOUSING, MEDICAL CARE, AND HEATING?: HARD

## 2021-12-03 ASSESSMENT — ACTIVITIES OF DAILY LIVING (ADL): DEPENDENT_IADLS:: INDEPENDENT

## 2021-12-03 NOTE — LETTER
[facility] CARE COORDINATION  [address] [place] [address]   [address]  [place] [address]  December 6, 2021    Alexander Thoo  [address]  [place] [address]      Dear Alexander,    I am a clinic care coordinator who works with Physician Meg Ref-Primary at [facility]. I wanted to thank you for spending the time to talk with me.  Below is a description of clinic care coordination and how I can further assist you.      The clinic care coordination team is made up of a registered nurse,  and community health worker who understand the health care system. The goal of clinic care coordination is to help you manage your health and improve access to the health care system in the most efficient manner. The team can assist you in meeting your health care goals by providing education, coordinating services, strengthening the communication among your providers and supporting you with any resource needs.    Please feel free to contact the Community Health Worker at [phone] with any questions or concerns. We are focused on providing you with the highest-quality healthcare experience possible and that all starts with you.     Sincerely,     JOSH Lilly, LSW  Social Work Care Coordinator  [facility]    Enclosed: I have enclosed a copy of the Patient Centered Plan of Care. This has helpful information and goals that we have talked about. Please keep this in an easy to access place to use as needed.

## 2021-12-03 NOTE — PROGRESS NOTES
Clinic Care Coordination Contact    Initial Assessment Note:     Present for today's visit is pt, Cynthia hill (via language line), and CCC SW.    Alexander Verdugo is a 35yo female. She was referred to CCC for SW assessment and baby/pregnancy resources consult.    PRIMARY CONCERN(S) ADDRESSED:    Discussion of pregnancy/baby resources    Assistance with household report form    REFERRALS PLACED:     Infant carseat - [facility] [facility]    SOCIAL DOCUMENTATION    PERSONAL  - [place] refugee. [facility]. [place] Moved from [place] to [place] in December 2020. She is not yet a [place] citizen, but has applied.   -  to spouse Gerald Zamarripa; they have 2 children together and are expecting a 3rd in April 2022.    LANGUAGE(S) SPOKEN  - Cynthia    LIVING SITUATION  - Lives with spouse and 2 children    EMPLOYMENT  - Homemaker    For further detail regarding specific items/topics addressed during today's visit, see below.    1. Complex care coordination, impaired ability to use community resources due to language barrier    2. Pregnancy  21w6d today. SALLIE: 4/9/22. She has two older school-aged children at home. CHW already placed carseat referral to [facility] [facility] on 11/4/21. She is aware that they will contact her directly to deliver this 4-6 weeks prior to her due date. She has an upcoming WIC appointment on 12/20 at 12:30pm - needs transportation arranged.    3. Financial difficulties  Initially tells me that their SNAP and MFIP benefits were cut off several months ago. Reports that she has gone to the [address] to reapply and submit requested documentation but they keep telling her they have not received it. Later gave me [facility] case #[id number].   Called [facility] E[facility] Info line ([phone]) together to check status of her case. Automated system informed us that SNAP/MFIP benefits were just received on 11/11. She then informed me that her spouse Gerald Zamarripa started a new job on September  ", which is what she has been trying to report. He is now making around $2200/month. We started a new household report form today. She will take the signature page home, have  sign, get husbands paystubs, and bring it back next Friday at which time I will fax the documents to [facility].    4. Assistance needed with transportation  Has ultrasound appointment at [facility] next  and a f/u visit with me at [facility] on Friday 12/10. Sent staff message to  Tory Dowell today and requested that medical rides be set up for both appointments next week. Per Tory: \"[employer] transportation phone # [phone] set up for both appts.\"    Plan:  1.) See goals.  2.) Begin scheduled outreach.    Referral Information:  Referral Source: PCP  Primary Diagnosis: Psychosocial    Chief Complaint   Patient presents with     Clinic Care Coordination - Initial     Clinic Care Coordination - Face To Face      Universal Utilization:  Clinic Utilization  Difficulty keeping appointments:: No  Compliance Concerns: No  No-Show Concerns: No  No PCP office visit in Past Year: No  Utilization    Hospital Admissions  0             ED Visits  0             No Show Count (past year)  0                Current as of: 2021  8:55 AM            Clinical Concerns:  Current Medical Concerns: See Problem List    Current Behavioral Concerns: See Problem List      Patient Active Problem List   Diagnosis     History of  section     Pregnancy, unspecified gestational age     Nausea and vomiting during pregnancy     Education Provided to patient: Role of CCC  Pain  Pain (GOAL):: No  Health Maintenance Reviewed: Due/Overdue  Clinical Pathway: None    Medication Management:  Medication review status: Medications reviewed and no changes reported per patient.           Functional Status:  Dependent ADLs:: Independent  Dependent IADLs:: Independent  Bed or wheelchair confined:: No  Mobility Status: " Independent  Fallen 2 or more times in the past year?: No  Any fall with injury in the past year?: No    Living Situation:  Current living arrangement:: I live in a private home with family  Type of residence:: Apartment    Lifestyle & Psychosocial Needs:  Social Determinants of Health     Tobacco Use: Low Risk      Smoking Tobacco Use: Never Smoker     Smokeless Tobacco Use: Never Used   Alcohol Use: Not At Risk     Frequency of Alcohol Consumption: Never     Average Number of Drinks: Not on file     Frequency of Binge Drinking: Never   Financial Resource Strain: High Risk     Difficulty of Paying Living Expenses: Hard   Food Insecurity: No Food Insecurity     Worried About Running Out of Food in the Last Year: Never true     Ran Out of Food in the Last Year: Never true   Transportation Needs: No Transportation Needs     Lack of Transportation (Medical): No     Lack of Transportation (Non-Medical): No   Physical Activity: Insufficiently Active     Days of Exercise per Week: 3 days     Minutes of Exercise per Session: 30 min   Stress: No Stress Concern Present     Feeling of Stress : Only a little   Social Connections: Moderately Integrated     Frequency of Communication with Friends and Family: More than three times a week     Frequency of Social Gatherings with Friends and Family: More than three times a week     Attends Jewish Services: More than 4 times per year     Active Member of Clubs or Organizations: No     Attends Club or Organization Meetings: Never     Marital Status:    Intimate Partner Violence: Not At Risk     Fear of Current or Ex-Partner: No     Emotionally Abused: No     Physically Abused: No     Sexually Abused: No   Depression: Not at risk     PHQ-2 Score: 0   Housing Stability: Low Risk      Unable to Pay for Housing in the Last Year: No     Number of Places Lived in the Last Year: 2     Unstable Housing in the Last Year: No     Diet:: Regular  Inadequate nutrition (GOAL):: No  Tube  Feeding: No  Inadequate activity/exercise (GOAL):: No  Significant changes in sleep pattern (GOAL): No  Transportation means:: Medical transport,Friend,Regular car (Neighbors)  Holiness or spiritual beliefs that impact treatment:: No  Mental health DX:: No  Mental health management concern (GOAL):: No  Chemical Dependency Status: No Current Concerns  Informal Support system:: Family,Spouse,Friends      Resources and Interventions:  Community Resources: Transportation Services,Pregnancy Programs,County Worker,Infant Car Seat program  Supplies used at home:: None  Equipment Currently Used at Home: none  Employment Status: homemaker     Advance Care Plan/Directive:  Advanced Care Plans/Directives on file:: No  Advanced Care Plan/Directive Status: Declined Further Information    Referrals Placed: [facility] Resources,Community Resources,Financial Services    Outreach Frequency: 6 weeks    Goals        1. Follow up with  (pt-stated)       Goal statement: I will follow up with CCC DIONISIO for assistance with my household report forms within the next 14 days.     Date goal set: 12/3/21  Barriers: Language barrier  Strengths: Accepting of support  Date to achieve by: 12/17/21  Patient expressed understanding of goal: Yes     Action steps to achieve this goal:  1.  I will attend my f/u appointment with DIONISIO Win on 12/10/21 at 1pm.  2.  I will communicate with CHW at outreach and schedule follow up visit(s) with DIONISIO Win as needed.     NOTE: [employer] transportation phone # [phone] set up by Tory Dowell as of 12/3/21.         2. WIC (pt-stated)       Goal statement: I would like to apply for benefits through the [facility] WIC program within the next 30 days.     Date goal set: 12/3/21  Barriers: Language barrier  Strengths: Accepting of support  Date to achieve by: 1/3/22  Patient expressed understanding of goal: Yes     Action steps to achieve this goal:  1.  I will attend my WIC appointment on 12/20/21 at  12:30pm.  2.  I will provide all necessary paperwork/documentation to apply for [facility] benefits.  3.  I will communicate with CHW at outreach and request assistance arranging medical rides as needed.    NOTE: Pt will need transportation arranged for this appointment.         3. Carseat (pt-stated)       Goal statement: I would like to obtain a carseat through [facility] [facility] prior to my SALLIE of 4/9/22.    Date goal set: 12/6/21  Barriers: Language barrier  Strengths: Accepting of support  Date to achieve by: 4/9/22  Patient expressed understanding of goal: Yes    Action steps to achievethis goal:  1.  I will answer the phone when [facility] contacts me 4-6 weeks prior to my SALLIE (4/9/22 to arrange carseat delivery.  2.  I will communicate with CHW at outreach.    NOTE: Carseat referral placed by CHW Mishel Meredith on 11/4/21.          Future Appointments              In 2 days [facility] US [facility] M [facility] Ultrasound, [facility]    In 4 days [facility] [place], [facility]    In 4 weeks Marisa Collier MD [facility] [place], [facility]    In 1 month Marisa Collier MD [facility] [place], [facility]    In 1 month Marisa Collier MD [facility] [place], [facility]    In 2 months Marisa Collier MD [facility] [place], [facility]    In 2 months Marisa Collier MD [facility] [place], [facility]    In 2 months Marisa Collier MD [facility] [place], [facility]    In 3 months Marisa Collier MD [facility] [place], [facility]    In 3 months Marisa Collier MD [facility] [place], [facility]

## 2021-12-03 NOTE — LETTER
[employer] [employer]  Patient Centered Plan of Care  About Me:        Patient Name:  Alexander Verdugo    YOB: 1985  Age:         36 year old   Harley MRN:    [id number] Telephone Information:  Home Phone [phone]   Mobile [phone]       Address:  [address]  [place] [address] Email address:  No e-mail address on record      Emergency Contact(s)    Name Relationship Lgl Grd Work Phone Home Phone Mobile Phone   1. SUDHA ESPINOSA Spouse    [phone]           Primary language:  Cynthia     needed? Yes   [place] [employer]:  [phone] op. 1  Other communication barriers:Language barrier    Preferred Method of Communication:     Current living arrangement: I live in a private home with family    Mobility Status/ Medical Equipment: Independent        Health Maintenance  Health Maintenance Reviewed: Due/Overdue       My Access Plan  Medical Emergency 911   Primary Clinic Line [facility] - [phone]   24 Hour Appointment Line [phone] or  [phone]) (toll-free)   24 Hour Nurse Line [phone] (toll-free)   Preferred Urgent Care [facility] [phone]     Preferred Hospital [facility]  [phone]     Preferred Pharmacy [facility] - [place], [place] - [address] [place] [address] Pkwy     Behavioral Health Crisis Line The National Suicide Prevention Lifeline at 1-902.349.9793 or 911             My Care Team Members  Patient Care Team       Relationship Specialty Notifications Start End    No Ref-Primary, Physician PCP - General   9/30/21     Fax: [phone]         Marisa Collier MD Assigned PCP   9/17/21     Phone: [phone] Fax: [phone]         [address] [place] [address]    Audelia Fields LSW Lead Care Coordinator  - Clinical Admissions 12/6/21     Jaya Rao Community Health Worker Primary Care - CC  12/6/21     Phone: [phone] Fax:  [phone]         [address] [place] [address] 1 [facility] [address]            My Care Plans  Self Management and Treatment Plan  Goals and (Comments)  Goals        General     1. Follow up with  (pt-stated)      Notes - Note edited  12/6/2021  9:17 AM by Audelia Fields LSW     Goal statement: I will follow up with [facility] for assistance with my household report forms within the next 14 days.     Date goal set: 12/3/21  Barriers: Language barrier  Strengths: Accepting of support  Date to achieve by: 12/17/21  Patient expressed understanding of goal: Yes     Action steps to achieve this goal:  1.  I will attend my f/u appointment with DIONISIO Win on 12/10/21 at 1pm.  2.  I will communicate with CHW at outreach and schedule follow up visit(s) with DIONISIO Win as needed.     NOTE: [employer] transportation phone # [phone] set up by Tory Dowell as of 12/3/21.       2. WIC (pt-stated)      Notes - Note edited  12/6/2021  9:18 AM by Audelia Fields LSW     Goal statement: I would like to apply for benefits through the [facility] program within the next 30 days.     Date goal set: 12/3/21  Barriers: Language barrier  Strengths: Accepting of support  Date to achieve by: 1/3/22  Patient expressed understanding of goal: Yes     Action steps to achieve this goal:  1.  I will attend my WIC appointment on 12/20/21 at 12:30pm.  2.  I will provide all necessary paperwork/documentation to apply for [place] benefits.  3.  I will communicate with CHW at outreach and request assistance arranging medical rides as needed.    NOTE: Pt will need transportation arranged for this appointment.       3. Carseat (pt-stated)      Notes - Note edited  12/6/2021  9:20 AM by Audelia Fields LSW     Goal statement: I would like to obtain a carseat through [employer] prior to my SALLIE of 4/9/22.    Date goal set: 12/6/21  Barriers: Language barrier  Strengths: Accepting of support  Date to achieve by:  22  Patient expressed understanding of goal: Yes    Action steps to achievethis goal:  1.  I will answer the phone when [employer] contacts me 4-6 weeks prior to my SALLIE (22 to arrange carseat delivery.  2.  I will communicate with CHW at outreach.    NOTE: Carseat referral placed by CHW Mishel Meredith on 21.             Action Plans on File:                       Advance Care Plans/Directives Type:   No data recorded    My Medical and Care Information  Problem List   Patient Active Problem List   Diagnosis     History of  section     Pregnancy, unspecified gestational age     Nausea and vomiting during pregnancy      Current Medications and Allergies:  See printed Medication Report.    Care Coordination Start Date: 12/3/2021   Frequency of Care Coordination: 6 weeks     Form Last Updated: 2021

## 2021-12-03 NOTE — PROGRESS NOTES
SUBJECTIVE:   at 21w6d by 18 week US. Estimated Date of Delivery: 2022.  She is doing well. Feeling much stronger due to better appetite. Nausea improved with zofran- she is still taking once per day. Denies constipation. She denies abdominal pain/cramping, vaginal bleeding, leakage of fluid. Fetal movement is present.   Concerns: transportation to appointments  ROS  Negative except as noted above in HPI.  OBJECTIVE:  Blood pressure 100/60, pulse 79, temperature 98  F (36.7  C), temperature source Oral, resp. rate 16, weight 61.9 kg (136 lb 8 oz), last menstrual period 2021, SpO2 99 %.  Gen: comfortable, no acute distress.  See OB Vitals flowsheet.  ASSESSMENT/PLAN:  Alexander was seen today for prenatal care.    Diagnoses and all orders for this visit:    Pregnancy, unspecified gestational age  -     US OB > 14 Weeks; Future    History of  section- reviewed records- hx of C/S due to fetal decelerations      -IUP at 21w6d:     Prenatal labs reviewed     Will set up transportation for her OB appointments- gave her  card # so she can call to set up transportation to other appointments, such as .    Fetal survey- scheduled with assistance of [place] today    GTT at next visit    Peripartum Anesthesia: need to discuss    Post-partum Contraception: need to discuss    Breast/Bottle: breast and bottle     RTC in 4 weeks or sooner with problems. TOLAC consent- refer to OB-GYN next visit, 1 hour GTT, syphilis, hemoglobin next visit    Visit completed along with assistance of Cynthia .    Marisa Collier MD

## 2021-12-08 ENCOUNTER — HOSPITAL ENCOUNTER (OUTPATIENT)
Dept: ULTRASOUND IMAGING | Facility: HOSPITAL | Age: 36
Discharge: HOME | End: 2021-12-08
Attending: FAMILY MEDICINE | Admitting: FAMILY MEDICINE
Payer: COMMERCIAL

## 2021-12-08 DIAGNOSIS — Z34.90 PREGNANCY, UNSPECIFIED GESTATIONAL AGE: ICD-10-CM

## 2021-12-08 PROCEDURE — 76805 OB US >/= 14 WKS SNGL FETUS: CPT

## 2021-12-10 ENCOUNTER — ALLIED HEALTH/NURSE VISIT (OUTPATIENT)
Dept: NURSING | Facility: CLINIC | Age: 36
End: 2021-12-10
Payer: COMMERCIAL

## 2021-12-10 DIAGNOSIS — Z71.89 COMPLEX CARE COORDINATION: Primary | ICD-10-CM

## 2021-12-10 DIAGNOSIS — Z59.9 FINANCIAL DIFFICULTIES: ICD-10-CM

## 2021-12-10 DIAGNOSIS — Z60.3 IMPAIRED ABILITY TO USE COMMUNITY RESOURCES DUE TO LANGUAGE BARRIER: ICD-10-CM

## 2021-12-10 DIAGNOSIS — Z59.41 FOOD INSECURITY: ICD-10-CM

## 2021-12-10 DIAGNOSIS — Z74.8 ASSISTANCE NEEDED WITH TRANSPORTATION: ICD-10-CM

## 2021-12-10 PROCEDURE — 99207 PR NO CHARGE LOS: CPT | Performed by: FAMILY MEDICINE

## 2021-12-10 SDOH — SOCIAL STABILITY - SOCIAL INSECURITY: ACCULTURATION DIFFICULTY: Z60.3

## 2021-12-10 SDOH — ECONOMIC STABILITY - FOOD INSECURITY: FOOD INSECURITY: Z59.41

## 2021-12-10 SDOH — ECONOMIC STABILITY - INCOME SECURITY: PROBLEM RELATED TO HOUSING AND ECONOMIC CIRCUMSTANCES, UNSPECIFIED: Z59.9

## 2021-12-10 NOTE — PROGRESS NOTES
Clinic Care Coordination Contact    Progress Note:     Present for today's visit is pt, Cynthia hill (via language line), and CCC SW.     Alexander Verdugo is a 35yo female who is known to me from previous assessment. She is here today to complete her updated household report form for benefits through [facility].    PRIMARY CONCERN(S) ADDRESSED:        REFERRALS PLACED:    FRW screening completed on 12/30/21    For further detail regarding specific items/topics addressed during today's visit, see below.    1. Complex care coordination, impaired ability to use community resources due to language barrier    2. Food insecurity, financial difficulties  We completed her household report form together today and it was faxed to [facility] at [phone]. Included were her 's paystubs from November which she brought with her today. Case #[id number] written on all documents. Note addended and FRW screening (see below) completed on 12/30/21 - patient reports that they did not receive any benefits in December. Unclear if they need to reapply? Will get FRW involved to further assist at this time.    Financial Resource Worker Screening:    [facility] Benefits:  Is patient requesting help applying for [facility] benefits?: Yes  Have you recently applied for any [facility] benefits?: Yes  What was applied for? : SNAP  Application date:: 11/1/2021  How many people in your household?: 4  Do you buy/eat food together?: Yes  What is the monthly gross income for the household (wages, social security, workers comp, and pension)? : 2222    [place]:  [place]?: Yes  Is this an insurance renewal?: No  Is this a new insurance application request?: No    Any other information for the FRW?: Family already has a case number which is [id number]. I helped her complete a household report form for November and faxed this in early December, but that didn't seem to change anything as they did not receive benefits for the month of  December. Spouse is the only one working. She is also pregnant.    3. Assistance needed with transportation  Needs medical transportation arranged to all upcoming appointments with OB provider and any in-person follow up visits with me, as well as ultrasounds. This has been noted in her chart.     Plan:  1.) See new/updated goals.  2.) Continue scheduled outreach.    Goals        1. FRW (pt-stated)       Goal statement: I need assistance contacting [facility] to resume my MFIP and SNAP benefits within the next 30 days.     Date goal set: 12/30/21  Barriers: Language barrier, food insecurity  Strengths: Accepting of support  Date to achieve by: 1/30/22  Patient expressed understanding of goal: Yes    Action steps to achieve this goal:  1.  I will answer the phone when FRW contacts me to discuss concerns related to my [facility] benefits.  2.  I will provide accurate information about my income, household size, etc. to ensure that my eligibility for benefits is determined appropriately.     NOTE: [facility] Case #[id number], Worker: Edy MCCLAIN, Ph: [phone]         2. WIC (pt-stated)       Goal statement: I would like to apply for benefits through the [facility] WIC program within the next 30 days.     Date goal set: 12/3/21  Barriers: Language barrier, food insecurity  Strengths: Accepting of support  Date to achieve by: 1/3/22  Patient expressed understanding of goal: Yes     Action steps to achieve this goal:  1.  I will provide all necessary paperwork/documentation to apply for WIC benefits.  2.  I will communicate with CHW at outreach and request assistance arranging medical rides as needed.    NOTE: CHW to confirm whether or not pt attended 12/20/21 WIC appt at next outreach.    Updated: 12/30/21         3. Carseat (pt-stated)       Goal statement: I would like to obtain a carseat through [employer] prior to my SALLIE of 4/9/22.    Date goal set: 12/6/21  Barriers: Language barrier, food insecurity  Strengths:  Accepting of support  Date to achieve by: 4/9/22  Patient expressed understanding of goal: Yes    Action steps to achievethis goal:  1.  I will answer the phone when [employer] contacts me 4-6 weeks prior to my SALLIE (4/9/22) to arrange carseat delivery.  2.  I will communicate with CHW at outreach.    NOTE: Carseat referral placed by CHW Mishel Meredith on 11/4/21.    Updated: 12/30/21

## 2021-12-10 NOTE — LETTER
[facility]  Patient Centered Plan of Care  About Me:        Patient Name:  Alexander Verdugo    YOB: 1985  Age:         36 year old   [facility] MRN:    [id number] Telephone Information:  Home Phone [phone]   Mobile [phone]       Address:  [address]  [place] [address] Email address:  No e-mail address on record      Emergency Contact(s)    Name Relationship Lgl Grd Work Phone Home Phone Mobile Phone   1. SUDHA ESPINOSA Spouse    [phone]           Primary language:  Cynthia     needed? Yes   [place] [place] [employer]:  [phone] op. 1  Other communication barriers:Language barrier    Preferred Method of Communication:     Current living arrangement: I live in a private home with family    Mobility Status/ Medical Equipment: Independent        Health Maintenance  Health Maintenance Reviewed: Due/Overdue       My Access Plan  Medical Emergency 911   Primary Clinic Line [facility] - [phone]   24 Hour Appointment Line [phone] or  [phone]) (toll-free)   24 Hour Nurse Line [phone] (toll-free)   Preferred Urgent Care [facility] [phone]     Preferred Hospital [facility]  [phone]     Preferred Pharmacy [facility] - [place], [place] - [address] [place] [address] Pkwy     Behavioral Health Crisis Line The National Suicide Prevention Lifeline at 1-551.481.9543 or 911             My Care Team Members  Patient Care Team       Relationship Specialty Notifications Start End    No Ref-Primary, Physician PCP - General   9/30/21     Fax: [phone]         Marisa Collier MD Assigned PCP   9/17/21     Phone: [phone] Fax: [phone]         [address] [place] [address]    Audelia Fields LSW Lead Care Coordinator  - Clinical Admissions 12/6/21     Jaya Rao Community Health Worker Primary Care - CC  12/6/21     Phone: [phone] Fax:  [phone]         [address] [place] [address] 1 [facility] [address]            My Care Plans  Self Management and Treatment Plan  Goals and (Comments)  Goals        General     1. FRW (pt-stated)      Notes - Note created  12/30/2021 11:35 AM by Audelia Fields LSW     Goal statement: I need assistance contacting [facility] to resume my MFIP and SNAP benefits within the next 30 days.     Date goal set: 12/30/21  Barriers: Language barrier, food insecurity  Strengths: Accepting of support  Date to achieve by: 1/30/22  Patient expressed understanding of goal: Yes    Action steps to achieve this goal:  1.  I will answer the phone when FRW contacts me to discuss concerns related to my [facility] benefits.  2.  I will provide accurate information about my income, household size, etc. to ensure that my eligibility for benefits is determined appropriately.     NOTE: [facility] Case #[id number], Worker: Edy MCCLAIN, Ph: [phone]       2. WIC (pt-stated)      Notes - Note edited  12/30/2021 11:35 AM by Audelia Fields LSW     Goal statement: I would like to apply for benefits through the [facility] program within the next 30 days.     Date goal set: 12/3/21  Barriers: Language barrier, food insecurity  Strengths: Accepting of support  Date to achieve by: 1/3/22  Patient expressed understanding of goal: Yes     Action steps to achieve this goal:  1.  I will provide all necessary paperwork/documentation to apply for [facility] benefits.  2.  I will communicate with CHW at outreach and request assistance arranging medical rides as needed.    NOTE: CHW to confirm whether or not pt attended 12/20/21 WIC appt at next outreach.    Updated: 12/30/21       3. Carseat (pt-stated)      Notes - Note edited  12/30/2021 11:35 AM by Audelia Fields LSW     Goal statement: I would like to obtain a carseat through [employer] prior to my SALLIE of 4/9/22.    Date goal set: 12/6/21  Barriers: Language barrier, food  insecurity  Strengths: Accepting of support  Date to achieve by: 22  Patient expressed understanding of goal: Yes    Action steps to achievethis goal:  1.  I will answer the phone when [employer] contacts me 4-6 weeks prior to my SALLIE (22) to arrange carseat delivery.  2.  I will communicate with CHW at outreach.    NOTE: Carseat referral placed by CHW Mishel Meredith on 21.    Updated: 21             Action Plans on File:                       Advance Care Plans/Directives Type:   No data recorded    My Medical and Care Information  Problem List   Patient Active Problem List   Diagnosis     History of  section     Pregnancy, unspecified gestational age     Nausea and vomiting during pregnancy      Current Medications and Allergies:  See printed Medication Report.    Care Coordination Start Date: 12/3/2021   Frequency of Care Coordination: 6 weeks     Form Last Updated: 2021

## 2021-12-13 NOTE — PROGRESS NOTES
Patient's health insurance, [facility] does not allow to set up ride to [facility] clinic. CHW called patient informed of this and patient will ask a friend for transportation and knows the address of the [facility] clinic: [facility] [address] [address]. Appt is 12/20/21 at 12:30pm.

## 2021-12-21 ENCOUNTER — PATIENT OUTREACH (OUTPATIENT)
Dept: CARE COORDINATION | Facility: CLINIC | Age: 36
End: 2021-12-21
Payer: COMMERCIAL

## 2021-12-21 ASSESSMENT — ACTIVITIES OF DAILY LIVING (ADL): DEPENDENT_IADLS:: INDEPENDENT

## 2021-12-21 NOTE — PROGRESS NOTES
Clinic Care Coordination Contact   Care Coordination Clinician Chart Review  Situation: Patient chart reviewed by care coordinator.       Background: Care Coordination initial assessment and enrollment to Care Coordination was 12/3/21.   Patient centered goals were developed with participation from patient.   Plan was to meet with DIONISIO CUELLO for assistance with forms.  Chart indicates a no show. 12/10.   DIONISIO CUELLO handed patient off to CHW for continued outreach every 30 days.        Assessment:.    Patient's goals remain appropriate and relevant at this time.   Patient not  due for updated Plan of Care.  Annual assessment will be due 12/3/22.      Goals        1. Follow up with  (pt-stated)       Goal statement: I will follow up with CCC DIONISIO for assistance with my household report forms within the next 14 days.     Date goal set: 12/3/21  Barriers: Language barrier  Strengths: Accepting of support  Date to achieve by: 2/1/22  Patient expressed understanding of goal: Yes     Action steps to achieve this goal:  1.  I will attend my f/u appointment with DIONISIO Win on 12/10/21 at 1pm.  2.  I will communicate with CHW at outreach and schedule follow up visit(s) with DIONISIO Win as needed.     NOTE: [facility] transportation phone # [phone] set up by Tory Dowell as of 12/3/21.    12/21/21 no show for DIONISIO appt . Need to re shcedule         2. WIC (pt-stated)       Goal statement: I would like to apply for benefits through the [facility] WIC program within the next 30 days.     Date goal set: 12/3/21  Barriers: Language barrier  Strengths: Accepting of support  Date to achieve by: 1/3/22  Patient expressed understanding of goal: Yes     Action steps to achieve this goal:  1.  I will attend my WIC appointment on 12/20/21 at 12:30pm.  2.  I will provide all necessary paperwork/documentation to apply for WIC benefits.  3.  I will communicate with CHW at outreach and request assistance arranging medical rides as  needed.    NOTE: Pt will need transportation arranged for this appointment.         3. Carseat (pt-stated)       Goal statement: I would like to obtain a carseat through [employer] prior to my SALLIE of 4/9/22.    Date goal set: 12/6/21  Barriers: Language barrier  Strengths: Accepting of support  Date to achieve by: 4/9/22  Patient expressed understanding of goal: Yes    Action steps to achieve this goal:  1.  I will answer the phone when [employer] contacts me 4-6 weeks prior to my SALLIE (4/9/22 to arrange carseat delivery.  2.  I will communicate with CHW at outreach.    NOTE: Carseat referral placed by CHW Mishel Meredith on 11/4/21.                Plan/Recommendations: The patient will continue working with Care Coordination to achieve goals as above.  CHW will involve SW  CC as needed or if patient is ready to move to maintenance.  SW CC will continue to monitor progress to goals and CHW outreaches every 6 weeks.  SW and CHW will clarify need to reschedule in clinic appt.   Plan of Care updated and mailed to patient: NORA Hays / NORA Schrader  [facility] Primary Care   Care Coordination  [facility]  12/21/2021 11:05 AM

## 2022-01-03 ENCOUNTER — PATIENT OUTREACH (OUTPATIENT)
Dept: CARE COORDINATION | Facility: CLINIC | Age: 37
End: 2022-01-03

## 2022-01-03 ENCOUNTER — PRENATAL OFFICE VISIT (OUTPATIENT)
Dept: FAMILY MEDICINE | Facility: CLINIC | Age: 37
End: 2022-01-03
Payer: COMMERCIAL

## 2022-01-03 VITALS
OXYGEN SATURATION: 99 % | TEMPERATURE: 98 F | HEART RATE: 76 BPM | DIASTOLIC BLOOD PRESSURE: 60 MMHG | SYSTOLIC BLOOD PRESSURE: 98 MMHG | WEIGHT: 139.5 LBS | RESPIRATION RATE: 16 BRPM

## 2022-01-03 DIAGNOSIS — Z34.90 PREGNANCY, UNSPECIFIED GESTATIONAL AGE: Primary | ICD-10-CM

## 2022-01-03 DIAGNOSIS — Z34.80 PRENATAL CARE, SUBSEQUENT PREGNANCY, UNSPECIFIED TRIMESTER: ICD-10-CM

## 2022-01-03 DIAGNOSIS — Z98.891 HISTORY OF CESAREAN SECTION: ICD-10-CM

## 2022-01-03 LAB
GLUCOSE 1H P 50 G GLC PO SERPL-MCNC: 99 MG/DL (ref 70–129)
HGB BLD-MCNC: 11.1 G/DL (ref 11.7–15.7)
T PALLIDUM AB SER QL: NONREACTIVE

## 2022-01-03 PROCEDURE — 99207 PR PRENATAL VISIT: CPT | Performed by: FAMILY MEDICINE

## 2022-01-03 PROCEDURE — 36415 COLL VENOUS BLD VENIPUNCTURE: CPT | Performed by: FAMILY MEDICINE

## 2022-01-03 PROCEDURE — 82950 GLUCOSE TEST: CPT | Performed by: FAMILY MEDICINE

## 2022-01-03 PROCEDURE — 86780 TREPONEMA PALLIDUM: CPT | Performed by: FAMILY MEDICINE

## 2022-01-03 PROCEDURE — 85018 HEMOGLOBIN: CPT | Performed by: FAMILY MEDICINE

## 2022-01-03 RX ORDER — PNV NO.95/FERROUS FUM/FOLIC AC 28MG-0.8MG
1 TABLET ORAL DAILY
Qty: 90 TABLET | Refills: 3 | Status: SHIPPED | OUTPATIENT
Start: 2022-01-03 | End: 2022-04-27

## 2022-01-03 NOTE — PROGRESS NOTES
SUBJECTIVE:   at 26w2d by 18 weeks. Estimated Date of Delivery: 2022.  She is doing well. She denies abdominal pain/cramping, vaginal bleeding, leakage of fluid. Fetal movement is present.   Concerns: Nausea much improved- only using zofran prn.  ROS  Negative except as noted above in HPI.  OBJECTIVE:  Blood pressure 98/60, pulse 76, temperature 98  F (36.7  C), temperature source Oral, resp. rate 16, weight 63.3 kg (139 lb 8 oz), last menstrual period 2021, SpO2 99 %.  Gen: comfortable, no acute distress.  See OB Vitals flowsheet.  ASSESSMENT/PLAN:  Alexander was seen today for prenatal care.    Diagnoses and all orders for this visit:    Pregnancy, unspecified gestational age  -     Glucose tolerance, gest screen, 1 hour; Future  -     Treponema Abs w Reflex to RPR and Titer; Future  -     Hemoglobin; Future  -     Ob/Gyn Referral; Future  -     Glucose tolerance, gest screen, 1 hour  -     Treponema Abs w Reflex to RPR and Titer  -     Hemoglobin    History of  section  -     Ob/Gyn Referral; Future    Prenatal care, subsequent pregnancy, unspecified trimester  -     Prenatal Vit-Fe Fumarate-FA (PRENATAL VITAMIN) 27-0.8 MG TABS; Take 1 tablet by mouth daily      -IUP at 26w2d:     Prenatal labs reviewed     Fetal survey was done     Breast/Bottle: breast and bottle    Reviewed Covid-19 vaccine, counseled on benefits- patient declines.     RTC in 2 weeks or sooner with problems. Tdap at next visit, assist with scheduling to [facility] for TOLAC consent if not yet scheduled    Visit completed along with assistance of Cynthia .    Marisa Collier MD

## 2022-01-03 NOTE — PROGRESS NOTES
Clinic Care Coordination Contact  Program: [facility]  County: [facility] Case #: [id number]  [facility] Worker:   Terejeremías #:   Subscriber #: [id number]  Renewal:  Date Applied: 2021    FRW Outreach:   1/3/2021: FRW called the [facility] EZ info line and it states no active programs. FRW called patient and she states she submitted the requested verification documents but she has not heard anything. We called the [facility] EZ info line together and left a vm for her worker to call one of us back with the status of their case. Patient states she called on  and the lady told her she didn't see the verification documents in the system, however, the EZ info line did lis the verifications submitted in Dec. FRW will make outreach next week.     SNAP/CASH Application Screenin. Have you had Critical access hospital benefits before? Yes  2. How many people in the household, do you eat/buy food together? 4  3. What is your monthly income (include all tax members)?   4. Do you have a bank account?   5. Do you have any utility bills (electricity, rent, mortgage, phone, insurance, medical bills, etc.)? Rent $   6. Do you have social security cards and/or green cards?       Health Insurance:    Major Programs  This subscriber has eligibility for MA: Medical Assistance.  Elig Type AA: Parent of a dependent child  Eligibility Begin Date: 2020  Eligibility End Date: --/--/----  This subscriber is eligible for the following service types: Medical Care ,  Chiropractic ,  Dental Care ,  Hospital ,  Hospital - Inpatient ,  Hospital - Outpatient ,  Emergency Services ,  Pharmacy ,  Professional (Physician) Visit - Office ,  Vision (Optometry) ,  Mental Health ,  Urgent Care  Prepaid Health Plan  This subscriber receives MA12 - Prepaid Medical Assistance Program (PMAP) delivered through [facility]. The phone numbers are: [phone] (metro) or [phone] (toll free).    Referral/Screening:    Financial Resource Worker  Screening:     [facility] Benefits:  Is patient requesting help applying for [facility] benefits?: Yes  Have you recently applied for any [facility] benefits?: Yes  What was applied for? : SNAP  Application date:: 11/1/2021  How many people in your household?: 4  Do you buy/eat food together?: Yes  What is the monthly gross income for the household (wages, social security, workers comp, and pension)? : 2222     [place]:  [place] Yes  Is this an insurance renewal?: No  Is this a new insurance application request?: No     Any other information for the FRW?: Family already has a case number which is [id number]. I helped her complete a household report form for November and faxed this in early December, but that didn't seem to change anything as they did not receive benefits for the month of December. Spouse is the only one working. She is also pregnant.    Financial Resource Worker Follow Up    Goals:   Goals Addressed as of 1/3/2022 at 11:02 AM                    Today    12/10/21       1. W (pt-stated)   20%  10%    Added 12/30/21 by Audelia Fields, RAFAELW      Goal statement: I need assistance contacting [facility] to resume my MFIP and SNAP benefits within the next 30 days.     Date goal set: 12/30/21  Barriers: Language barrier, food insecurity  Strengths: Accepting of support  Date to achieve by: 1/30/22  Patient expressed understanding of goal: Yes    Action steps to achieve this goal:  1.  I will answer the phone when FRW contacts me to discuss concerns related to my [facility] benefits.  2.  I will provide accurate information about my income, household size, etc. to ensure that my eligibility for benefits is determined appropriately.     NOTE: [facility] Case #[id number], Worker: Edy MCCLAIN, Ph: [phone]            Intervention and Education during outreach: n/a    FRW Plan: FRW will follow up next week

## 2022-01-06 ENCOUNTER — PATIENT OUTREACH (OUTPATIENT)
Dept: NURSING | Facility: CLINIC | Age: 37
End: 2022-01-06
Payer: COMMERCIAL

## 2022-01-06 NOTE — PROGRESS NOTES
Clinic Care Coordination Contact    Community Health Worker Follow Up    Care Gaps:   Health Maintenance Due   Topic Date Due     PREVENTIVE CARE VISIT  Never done     COVID-19 Vaccine (1) Never done     DTAP/TDAP/TD IMMUNIZATION (1 - Tdap) Never done     MATERNAL SCREENING  Never done     PCP to address care gaps with patient at the next outreach.   Goals:   Goals Addressed as of 1/6/2022 at 2:21 PM                    Today    1/3/22       1. FRW (pt-stated)   30%  20%    Added 12/30/21 by Audelia Fields LSW      Goal statement: I need assistance contacting [facility] to resume my MFIP and SNAP benefits within the next 30 days.     Date goal set: 12/30/21  Barriers: Language barrier, food insecurity  Strengths: Accepting of support  Date to achieve by: 1/30/22  Patient expressed understanding of goal: Yes    Action steps to achieve this goal:  1.  I will continue to work with FRW regarding regarding County benefits.  2.  I will provide accurate information about my income, household size, etc. to ensure that my eligibility for benefits is determined appropriately.     NOTE: [facility] Case #[id number], Worker: Edy MCCLAIN, Ph: [phone]    Goal update: 1/06/2022         3. Carseat (pt-stated)   30%      Added 12/6/21 by Audelia Fields LSW      Goal statement: I would like to obtain a carseat through [employer] prior to my SALLIE of 4/9/22.    Date goal set: 12/6/21  Barriers: Language barrier, food insecurity  Strengths: Accepting of support  Date to achieve by: 4/9/22  Patient expressed understanding of goal: Yes    Action steps to achievethis goal:  1.  I will answer the phone when [employer] contacts me 4-6 weeks prior to my SALLIE (4/9/22) to arrange carseat delivery.  2.  I will communicate with CHW at outreach.    NOTE: Carseat referral placed by CHW Mishel Meredith on 11/4/21 and pending.     Updated: 1/06/2022        Intervention and Education during outreach:  -CHW reminded patient of  upcoming OB appointments and patient is aware.   -Patient continues working with FRW regarding SNAP, County benefits, MFIP and etc.  -Spouse is working and has 2 children.  -CHW informed patient to call for additional coordination assistance when needed.       CHW Next Outreach: In one month.

## 2022-01-10 ENCOUNTER — PATIENT OUTREACH (OUTPATIENT)
Dept: CARE COORDINATION | Facility: CLINIC | Age: 37
End: 2022-01-10
Payer: COMMERCIAL

## 2022-01-10 NOTE — PROGRESS NOTES
Clinic Care Coordination Contact  [facility]  County: [facility] Case #: [id number]  [facility] Worker: Edy [phone]  Mnsure #:   Subscriber #: [id number]  Renewal:  Date Applied: 2021      FRW Outreach:  1/10/2022:  FRW called patient and she states she has not heard back on her SNAP application yet. Patient states she called the county and they told her they received the application but the worker has not processed it yet. Patient states she also spoke to the worker and they got into a bit of an argument. FRW and patient called the [facility] EZ info line to find out the status of the case. We spoke to Emani and she states a new application was done on 21 and the worker needs verification of stop employment. There's a note on , they received paycheck stubs but they need verification of job stopped, Also, if patient wants to apply for CASH assistance she will need to complete a new application as the  application was only for SNAP. Patient states her  works for [employer] currently. We called the worker Edy and left a  vm advising him patient's  still works at [employer] and there should not be a request for a stop work. FRW will follow up with patient next.   1/3/2022: FRW called the [facility] EZ info line and it states no active programs. FRW called patient and she states she submitted the requested verification documents but she has not heard anything. We called the [facility] EZ info line together and left a vm for her worker to call one of us back with the status of their case. Patient states she called on  and the lady told her she didn't see the verification documents in the system, however, the EZ info line did lis the verifications submitted in Dec. FRW will make outreach next week.     SNAP/CASH Application Screenin. Have you had county benefits before? Yes  2. How many people in the household, do you eat/buy food together? 4  3. What  is your monthly income (include all tax members)?   4. Do you have a bank account?   5. Do you have any utility bills (electricity, rent, mortgage, phone, insurance, medical bills, etc.)? Rent $   6. Do you have social security cards and/or green cards?       Health Insurance:    Major Programs  This subscriber has eligibility for [place]: Medical Assistance.  Elig Type AA: Parent of a dependent child  Eligibility Begin Date: 09/01/2020  Eligibility End Date: --/--/----  This subscriber is eligible for the following service types: Medical Care ,  Chiropractic ,  Dental Care ,  Hospital ,  Hospital - Inpatient ,  Hospital - Outpatient ,  Emergency Services ,  Pharmacy ,  Professional (Physician) Visit - Office ,  Vision (Optometry) ,  Mental Health ,  Urgent Care  Prepaid Health Plan  This subscriber receives MA12 - Prepaid Medical Assistance Program (PMAP) delivered through [facility]. The phone numbers are: [phone] (metro) or [phone] (toll free).    Referral/Screening:    Financial Resource Worker Screening:     [facility] Benefits:  Is patient requesting help applying for [facility] access hospital benefits?: Yes  Have you recently applied for any [facility] access hospital benefits?: Yes  What was applied for? : SNAP  Application date:: 11/1/2021  How many people in your household?: 4  Do you buy/eat food together?: Yes  What is the monthly gross income for the household (wages, social security, workers comp, and pension)? : 2222     [place]:  [place] Yes  Is this an insurance renewal?: No  Is this a new insurance application request?: No     Any other information for the FR?: Family already has a case number which is [id number]. I helped her complete a household report form for November and faxed this in early December, but that didn't seem to change anything as they did not receive benefits for the month of December. Spouse is the only one working. She is also pregnant.    Financial Resource Worker Follow  Up    Goals:   Goals Addressed as of 1/10/2022 at 9:29 AM                    Today    1/6/22       1. FRW (pt-stated)   40%  30%    Added 12/30/21 by Audeila Fields LSW      Goal statement: I need assistance contacting [facility] to resume my MFIP and SNAP benefits within the next 30 days.     Date goal set: 12/30/21  Barriers: Language barrier, food insecurity  Strengths: Accepting of support  Date to achieve by: 1/30/22  Patient expressed understanding of goal: Yes    Action steps to achieve this goal:  1.  I will continue to work with FRW regarding regarding [facility] benefits.  2.  I will provide accurate information about my income, household size, etc. to ensure that my eligibility for benefits is determined appropriately.     NOTE: [facility] Case #[id number], Worker: Edy MCCLAIN Ph: [phone]    Goal update: 1/06/2022            Intervention and Education during outreach: n/a    FRW Plan: FRW will follow up next week

## 2022-01-17 ENCOUNTER — PRENATAL OFFICE VISIT (OUTPATIENT)
Dept: FAMILY MEDICINE | Facility: CLINIC | Age: 37
End: 2022-01-17
Payer: COMMERCIAL

## 2022-01-17 ENCOUNTER — MEDICAL CORRESPONDENCE (OUTPATIENT)
Dept: HEALTH INFORMATION MANAGEMENT | Facility: CLINIC | Age: 37
End: 2022-01-17

## 2022-01-17 VITALS
OXYGEN SATURATION: 98 % | HEART RATE: 88 BPM | DIASTOLIC BLOOD PRESSURE: 58 MMHG | RESPIRATION RATE: 16 BRPM | SYSTOLIC BLOOD PRESSURE: 90 MMHG | WEIGHT: 141 LBS | TEMPERATURE: 98.5 F

## 2022-01-17 DIAGNOSIS — Z98.891 HISTORY OF CESAREAN SECTION: ICD-10-CM

## 2022-01-17 DIAGNOSIS — Z34.90 PREGNANCY, UNSPECIFIED GESTATIONAL AGE: ICD-10-CM

## 2022-01-17 DIAGNOSIS — Z23 IMMUNIZATION DUE: Primary | ICD-10-CM

## 2022-01-17 PROCEDURE — 99207 PR PRENATAL VISIT: CPT | Performed by: FAMILY MEDICINE

## 2022-01-17 PROCEDURE — 0051A COVID-19,PF,PFIZER (12+ YRS): CPT | Performed by: FAMILY MEDICINE

## 2022-01-17 PROCEDURE — 91305 COVID-19,PF,PFIZER (12+ YRS): CPT | Performed by: FAMILY MEDICINE

## 2022-01-17 PROCEDURE — 90471 IMMUNIZATION ADMIN: CPT | Performed by: FAMILY MEDICINE

## 2022-01-17 PROCEDURE — 90715 TDAP VACCINE 7 YRS/> IM: CPT | Performed by: FAMILY MEDICINE

## 2022-01-17 NOTE — PROGRESS NOTES
ASSESSMENT/PLAN:   Alexander was seen today for prenatal care.    Diagnoses and all orders for this visit:    Immunization due  -     TDAP VACCINE (Adacel, Boostrix)  [[id number]]    Pregnancy, unspecified gestational age  -     Ob/Gyn Referral; Future    History of  section  -     Ob/Gyn Referral; Future    Other orders  -     COVID-19,PF,PFIZER (12+ Yrs GRAY LABEL)    will refer to [facility] for tolac consult.    Will sign for previous records again from hospital in [place].      I reviewed the covid vaccine. Reviewed that baby will be born with some antibodies to covid with the vaccine .   Reviewed tdap vaccine.    Baby is transverse currently.  Monitor position.       No follow-ups on file.       ======================================================    SUBJECTIVE  Alexander Verdugo is a 37 year old female here for    at 28w2d here for routine prenatal visit.    She is doing well.  Denies any bleeding, cramping ,or lof.  Baby is moving well.    Denies any headaches or vision changes.    Denies any lower extremity edema.    Denies any ruq pain.  Denies itching. .  pts perception of her last  is that it happened because she forgot to tell the staff at the hospital that her water broke at home.        ROS  Complete 10 point review of systems negative except as noted above in HPI      OBJECTIVE  BP 90/58   Pulse 88   Temp 98.5  F (36.9  C) (Oral)   Resp 16   Wt 64 kg (141 lb)   LMP 2021 (Approximate)   SpO2 98%    Gen:  nad  Current Outpatient Medications   Medication     Prenatal Vit-Fe Fumarate-FA (PRENATAL VITAMIN) 27-0.8 MG TABS     No current facility-administered medications for this visit.      Patient Active Problem List   Diagnosis     History of  section     Pregnancy, unspecified gestational age     Nausea and vomiting during pregnancy        LABS & IMAGES   No results found for any visits on  01/17/22.      ======================================================    MDM          Options for treatment and follow-up care were reviewed with the patient. Mu Thoo and/or guardian was engaged and actively involved in the decision making process. Mu Thoo and/or guardian verbalized understanding of the options discussed and was satisfied with the final plan.      Mary Holcomb MD

## 2022-01-20 ENCOUNTER — PATIENT OUTREACH (OUTPATIENT)
Dept: CARE COORDINATION | Facility: CLINIC | Age: 37
End: 2022-01-20
Payer: COMMERCIAL

## 2022-01-20 NOTE — PROGRESS NOTES
Clinic Care Coordination Contact  Program: [facility]  County: [facility] Case #: [id number]  [facility] Worker: Edy [phone]  Subscriber #: [id number]  Renewal:  Date Applied: 12/14/2021      FRW Outreach:  1/20/2022: FRW called patient with no answer and was unable to leave a vm as one is not set up. FRW called back and patient states she has not heard anything back on the application but she submitted additional documents on 1/7/22. We called Dianen and left a vm with call back information. FRW will follow up with patient in 1 week.   1/10/2022:  FRW called patient and she states she has not heard back on her SNAP application yet. Patient states she called the [facility] and they told her they received the application but the worker has not processed it yet. Patient states she also spoke to the worker and they got into a bit of an argument. FRW and patient called the [facility] [employer] info line to find out the status of the case. We spoke to Emani and she states a new application was done on 12/14/21 and the worker needs verification of stop employment. There's a note on 1/6, they received paycheck stubs but they need verification of job stopped, Also, if patient wants to apply for CASH assistance she will need to complete a new application as the 12/14 application was only for SNAP. Patient states her  works for [employer] currently. We called the worker Edy and left a  vm advising him patient's  still works at [employer] and there should not be a request for a stop work. FRW will follow up with patient next.   1/3/2022: FRW called the [facility] [employer] info line and it states no active programs. FRW called patient and she states she submitted the requested verification documents but she has not heard anything. We called the [facility] [employer] info line together and left a vm for her worker to call one of us back with the status of their case. Patient states she called on 12/23 and the lady told her  she didn't see the verification documents in the system, however, the EZ info line did lis the verifications submitted in Dec. FRW will make outreach next week.     SNAP/CASH Application Screenin. Have you had county benefits before? Yes  2. How many people in the household, do you eat/buy food together? 4  3. What is your monthly income (include all tax members)?   4. Do you have a bank account?   5. Do you have any utility bills (electricity, rent, mortgage, phone, insurance, medical bills, etc.)? Rent $   6. Do you have social security cards and/or green cards?       Health Insurance:    Major Programs  This subscriber has eligibility for [place]: Medical Assistance.  Elig Type AA: Parent of a dependent child  Eligibility Begin Date: 2020  Eligibility End Date: --/--/----  This subscriber is eligible for the following service types: Medical Care ,  Chiropractic ,  Dental Care ,  Hospital ,  Hospital - Inpatient ,  Hospital - Outpatient ,  Emergency Services ,  Pharmacy ,  Professional (Physician) Visit - Office ,  Vision (Optometry) ,  Mental Health ,  Urgent Care  Prepaid Health Plan  This subscriber receives MA12 - Prepaid Medical Assistance Program (PMAP) delivered through [facility]. The phone numbers are: [phone] (metro) or [phone] (toll free).    Referral/Screening:    Financial Resource Worker Screening:     [facility] Benefits:  Is patient requesting help applying for [facility] benefits?: Yes  Have you recently applied for any [facility] benefits?: Yes  What was applied for? : SNAP  Application date:: 2021  How many people in your household?: 4  Do you buy/eat food together?: Yes  What is the monthly gross income for the household (wages, social security, workers comp, and pension)? : 2222     [place]:  [place] Yes  Is this an insurance renewal?: No  Is this a new insurance application request?: No     Any other information for the FRW?: Family already  has a case number which is [id number]. I helped her complete a household report form for November and faxed this in early December, but that didn't seem to change anything as they did not receive benefits for the month of December. Spouse is the only one working. She is also pregnant.    Financial Resource Worker Follow Up    Goals:   Goals Addressed as of 1/10/2022 at 9:29 AM                    Today    1/6/22       1. FRW (pt-stated)   40%  30%    Added 12/30/21 by Audelia Fields LSW      Goal statement: I need assistance contacting [facility] to resume my MFIP and SNAP benefits within the next 30 days.     Date goal set: 12/30/21  Barriers: Language barrier, food insecurity  Strengths: Accepting of support  Date to achieve by: 1/30/22  Patient expressed understanding of goal: Yes    Action steps to achieve this goal:  1.  I will continue to work with FRW regarding regarding [facility] benefits.  2.  I will provide accurate information about my income, household size, etc. to ensure that my eligibility for benefits is determined appropriately.     NOTE: [facility] Case #[id number], Worker: Edy MCCLAIN, Ph: [phone]    Goal update: 1/06/2022            Intervention and Education during outreach: n/a    FRW Plan: FRW will follow in 1 week

## 2022-01-26 ENCOUNTER — PATIENT OUTREACH (OUTPATIENT)
Dept: CARE COORDINATION | Facility: CLINIC | Age: 37
End: 2022-01-26
Payer: COMMERCIAL

## 2022-01-26 NOTE — PROGRESS NOTES
Clinic Care Coordination Contact  Program: [facility]  County: [facility] Case #: [id number]  [facility] Worker: Edy [phone]  Subscriber #: [id number]  Renewal:  Date Applied: 12/14/2021      FRW Outreach:  1/26/2022: FRW received a vm from the worker stating the case is closed and they need to reapply. FRW called patient and advised her of the vm from her worker. Patient stats she completed another application on 1/7/22 and she received a letter in the mail but she is not sure what it says. Patient does not have an e-mail account to send FRW a copy so she will find someone to help her read it. FRW provided contact information and asked her to call me if she needs assistance otherwise FRW will follow up next week.   1/20/2022: FRW called patient with no answer and was unable to leave a vm as one is not set up. FRW called back and patient states she has not heard anything back on the application but she submitted additional documents on 1/7/22. We called Edy and left a vm with call back information. FRW will follow up with patient in 1 week.   1/10/2022:  FRW called patient and she states she has not heard back on her SNAP application yet. Patient states she called the county and they told her they received the application but the worker has not processed it yet. Patient states she also spoke to the worker and they got into a bit of an argument. FRW and patient called the [facility] EZ info line to find out the status of the case. We spoke to Emani and she states a new application was done on 12/14/21 and the worker needs verification of stop employment. There's a note on 1/6, they received paycheck stubs but they need verification of job stopped, Also, if patient wants to apply for CASH assistance she will need to complete a new application as the 12/14 application was only for SNAP. Patient states her  works for [employer] currently. We called the worker Edy and left a  vm advising him  patient's  still works at [employer] and there should not be a request for a stop work. FRW will follow up with patient next.   1/3/2022: FRW called the [facility] EZ info line and it states no active programs. FRW called patient and she states she submitted the requested verification documents but she has not heard anything. We called the [facility] EZ info line together and left a vm for her worker to call one of us back with the status of their case. Patient states she called on  and the lady told her she didn't see the verification documents in the system, however, the EZ info line did lis the verifications submitted in Dec. FRW will make outreach next week.     SNAP/CASH Application Screenin. Have you had [facility] benefits before? Yes  2. How many people in the household, do you eat/buy food together? 4  3. What is your monthly income (include all tax members)?   4. Do you have a bank account?   5. Do you have any utility bills (electricity, rent, mortgage, phone, insurance, medical bills, etc.)? Rent $   6. Do you have social security cards and/or green cards?       Health Insurance:    Major Programs  This subscriber has eligibility for MA: Medical Assistance.  Elig Type AA: Parent of a dependent child  Eligibility Begin Date: 2020  Eligibility End Date: --/--/----  This subscriber is eligible for the following service types: Medical Care ,  Chiropractic ,  Dental Care ,  Hospital ,  Hospital - Inpatient ,  Hospital - Outpatient ,  Emergency Services ,  Pharmacy ,  Professional (Physician) Visit - Office ,  Vision (Optometry) ,  Mental Health ,  Urgent Care  Prepaid Health Plan  This subscriber receives MA12 - Prepaid Medical Assistance Program (PMAP) delivered through [facility]. The phone numbers are: [phone] (metro) or [phone] (toll free).    Referral/Screening:    Financial Resource Worker Screening:     [facility] Benefits:  Is patient requesting help applying for  [facility] benefits?: Yes  Have you recently applied for any [facility] benefits?: Yes  What was applied for? : SNAP  Application date:: 11/1/2021  How many people in your household?: 4  Do you buy/eat food together?: Yes  What is the monthly gross income for the household (wages, social security, workers comp, and pension)? : 2222     [place]:  [place] Yes  Is this an insurance renewal?: No  Is this a new insurance application request?: No     Any other information for the FRW?: Family already has a case number which is [id number]. I helped her complete a household report form for November and faxed this in early December, but that didn't seem to change anything as they did not receive benefits for the month of December. Spouse is the only one working. She is also pregnant.    Financial Resource Worker Follow Up    Goals:   Goals Addressed as of 1/10/2022 at 9:29 AM                    Today    1/6/22       1. FRW (pt-stated)   40%  30%    Added 12/30/21 by Audelia Fields, RAFAELW      Goal statement: I need assistance contacting [facility] to resume my MFIP and SNAP benefits within the next 30 days.     Date goal set: 12/30/21  Barriers: Language barrier, food insecurity  Strengths: Accepting of support  Date to achieve by: 1/30/22  Patient expressed understanding of goal: Yes    Action steps to achieve this goal:  1.  I will continue to work with FRW regarding regarding [facility] benefits.  2.  I will provide accurate information about my income, household size, etc. to ensure that my eligibility for benefits is determined appropriately.     NOTE: [facility] Case #[id number], Worker: Edy MCCLAIN, Ph: [phone]    Goal update: 1/06/2022            Intervention and Education during outreach: n/a    FRW Plan: FRW will follow up next week

## 2022-01-31 ENCOUNTER — PRENATAL OFFICE VISIT (OUTPATIENT)
Dept: FAMILY MEDICINE | Facility: CLINIC | Age: 37
End: 2022-01-31
Payer: COMMERCIAL

## 2022-01-31 ENCOUNTER — TRANSFERRED RECORDS (OUTPATIENT)
Dept: HEALTH INFORMATION MANAGEMENT | Facility: CLINIC | Age: 37
End: 2022-01-31

## 2022-01-31 VITALS
BODY MASS INDEX: 26.2 KG/M2 | WEIGHT: 138.75 LBS | TEMPERATURE: 98.3 F | SYSTOLIC BLOOD PRESSURE: 94 MMHG | RESPIRATION RATE: 16 BRPM | OXYGEN SATURATION: 98 % | HEIGHT: 61 IN | DIASTOLIC BLOOD PRESSURE: 60 MMHG | HEART RATE: 80 BPM

## 2022-01-31 DIAGNOSIS — Z98.891 HISTORY OF CESAREAN SECTION: ICD-10-CM

## 2022-01-31 DIAGNOSIS — Z34.90 PREGNANCY, UNSPECIFIED GESTATIONAL AGE: Primary | ICD-10-CM

## 2022-01-31 PROCEDURE — 99207 PR PRENATAL VISIT: CPT | Performed by: FAMILY MEDICINE

## 2022-01-31 ASSESSMENT — MIFFLIN-ST. JEOR: SCORE: 1257.78

## 2022-01-31 NOTE — PROGRESS NOTES
"SUBJECTIVE:   at 30w2d. Estimated Date of Delivery: 2022.  She is doing well. She denies vaginal bleeding, leakage of fluid, or urinary symptoms. Fetal movement is present. She is not having contractions.  Concerns: had sore arms after covid vaccine for 1 day but took tylenol and her symptoms resolved.   ROS  Negative except as noted above in HPI  OBJECTIVE:  Blood pressure 94/60, pulse 80, temperature 98.3  F (36.8  C), temperature source Oral, resp. rate 16, height 1.543 m (5' 0.75\"), weight 62.9 kg (138 lb 12 oz), last menstrual period 2021, SpO2 98 %.  Gen: Comfortable, no acute distress.  Abd: Gravid, non-tender  See OB Vitals flowsheet.  ASSESSMENT/PLAN:  Mu was seen today for prenatal care.    Diagnoses and all orders for this visit:    Pregnancy, unspecified gestational age    History of  section: Has appointment for TOLAC consent later today at [facility]. Reviewed records today- her C/S was emergency due to prolonged deceleration thought to be related to cord prolapse. Her OB history was also updated today- history of 1 missed AB s/p D&C and 1 blighted ovum s/p D&C.       -IUP at 30w2d:     Prenatal labs reviewed     Fetal survey was done     Peripartum Anesthesia: the patient does not desire an epidural during labor. Reviewed options including IV analgesia and epidural- she did not like the sensation of epidural with her last baby and would like to try unmedicated.    Post-partum Contraception: patch- she will use minipill while breastfeeding and then transition to patch.    Breast/Bottle: Breast     RTC in 2 weeks or sooner with problems.    Visit completed along with assistance of Cynthia .    Marisa Collier MD    "

## 2022-02-01 ENCOUNTER — PATIENT OUTREACH (OUTPATIENT)
Dept: CARE COORDINATION | Facility: CLINIC | Age: 37
End: 2022-02-01
Payer: COMMERCIAL

## 2022-02-01 ASSESSMENT — ACTIVITIES OF DAILY LIVING (ADL): DEPENDENT_IADLS:: INDEPENDENT

## 2022-02-01 NOTE — PROGRESS NOTES
Clinic Care Coordination Contact  Care Coordination Clinician Chart Review  Situation: Patient chart reviewed by care coordinator.       Background: Pregnancy, need for county benefits and Car seat.  Care Coordination initial assessment and enrollment to Care Coordination was 12/3/21.   Patient centered goals were developed with participation from patient.    DIONISIO CUELLO handed patient off to CHW for continued outreach every 30 days.        Assessment: Per chart review, patient outreach completed by CC CHW on 1/6/22  Patient is actively working to accomplish goals  .  Has been working with FRW . Needs to re apply for [facility] benefits.  CHW made referral for Car seat.  Patient's goal remains appropriate and relevant at this time.   Patient not  due for updated Plan of Care.  Annual assessment will be due 12/3/22.      Goals        1. FRW (pt-stated)       Goal statement: I need assistance contacting [facility] to resume my MFIP and SNAP benefits within the next 30 days.     Date goal set: 12/30/21  Barriers: Language barrier, food insecurity  Strengths: Accepting of support  Date to achieve by:  3/1/22  Patient expressed understanding of goal: Yes    Action steps to achieve this goal:  1.  I will continue to work with FRW regarding [facility] benefits.  2.  I will provide accurate information about my income, household size, etc. to ensure that my eligibility for benefits is determined appropriately.     NOTE: [facility] Case #[id number], Worker: Edy MCCLAIN, Ph: [phone]    Goal update: 1/06/2022         3. Carseat (pt-stated)       Goal statement: I would like to obtain a carseat through [employer] prior to my SALLIE of 4/9/22.    Date goal set: 12/6/21  Barriers: Language barrier, food insecurity  Strengths: Accepting of support  Date to achieve by: 4/9/22  Patient expressed understanding of goal: Yes    Action steps to achieve this goal:  1.  I will answer the phone when [employer] contacts me 4-6 weeks prior to my  SALLIE (4/9/22) to arrange carseat delivery.  2.  I will communicate with CHW at outreach.    NOTE: Carseat referral placed by CHW Mishel Meredith on 11/4/21 and pending.     Updated: 1/06/2022                Plan/Recommendations: The patient will continue working with Care Coordination to achieve goals as above.  CHW will involve SW CC as needed or if patient is ready to move to maintenance.  SW CC will continue to monitor progress to goals and CHW outreaches every 6 weeks.   Plan of Care updated and mailed to patient: NORA Hays / NORA Schrader  [facility]   Care Coordination  [facility]  2/1/2022 10:15 AM

## 2022-02-03 ENCOUNTER — PATIENT OUTREACH (OUTPATIENT)
Dept: CARE COORDINATION | Facility: CLINIC | Age: 37
End: 2022-02-03
Payer: COMMERCIAL

## 2022-02-03 NOTE — PROGRESS NOTES
Clinic Care Coordination Contact  Program: [facility]  County: [facility] Case #: [id number]  [facility] Worker: Edy [phone]  Subscriber #: [id number]  Renewal:  Date Applied: 12/14/2021      FRW Outreach:  2/3/2022: FRW called the [facility] EZ info line and it states SNAP is active and $395 was issued on 2/2/22 for Jan and $491 as issued for February on the same day. FRW called patient and advised her of this. She states she has an EBT card at home and she will check the balance. FRW will resolve the FRW episode and remove patient from panel.   1/26/2022: FRW received a vm from the worker stating the case is closed and they need to reapply. FRW called patient and advised her of the vm from her worker. Patient stats she completed another application on 1/7/22 and she received a letter in the mail but she is not sure what it says. Patient does not have an e-mail account to send FRW a copy so she will find someone to help her read it. FRW provided contact information and asked her to call me if she needs assistance otherwise FRW will follow up next week.   1/20/2022: FRW called patient with no answer and was unable to leave a vm as one is not set up. FRW called back and patient states she has not heard anything back on the application but she submitted additional documents on 1/7/22. We called [employer] and left a vm with call back information. FRW will follow up with patient in 1 week.   1/10/2022:  FRW called patient and she states she has not heard back on her SNAP application yet. Patient states she called the [facility] and they told her they received the application but the worker has not processed it yet. Patient states she also spoke to the worker and they got into a bit of an argument. FRW and patient called the [facility] EZ info line to find out the status of the case. We spoke to Emani and she states a new application was done on 12/14/21 and the worker needs verification of stop employment.  There's a note on , they received paycheck stubs but they need verification of job stopped, Also, if patient wants to apply for CASH assistance she will need to complete a new application as the  application was only for SNAP. Patient states her  works for [employer] currently. We called the worker Edy and left a  vm advising him patient's  still works at [employer] and there should not be a request for a stop work. FRW will follow up with patient next.   1/3/2022: FRW called the [facility] [employer] line and it states no active programs. FRW called patient and she states she submitted the requested verification documents but she has not heard anything. We called the [facility] EZ info line together and left a vm for her worker to call one of us back with the status of their case. Patient states she called on  and the lady told her she didn't see the verification documents in the system, however, the EZ info line did lis the verifications submitted in Dec. FRW will make outreach next week.     SNAP/CASH Application Screenin. Have you had county benefits before? Yes  2. How many people in the household, do you eat/buy food together? 4  3. What is your monthly income (include all tax members)?   4. Do you have a bank account?   5. Do you have any utility bills (electricity, rent, mortgage, phone, insurance, medical bills, etc.)? Rent $   6. Do you have social security cards and/or green cards?       Health Insurance:    Major Programs  This subscriber has eligibility for [place]: Medical Assistance.  Elig Type AA: Parent of a dependent child  Eligibility Begin Date: 2020  Eligibility End Date: --/--/----  This subscriber is eligible for the following service types: Medical Care ,  Chiropractic ,  Dental Care ,  Hospital ,  Hospital - Inpatient ,  Hospital - Outpatient ,  Emergency Services ,  Pharmacy ,  Professional (Physician) Visit - Office ,  Vision (Optometry) ,  Mental Health  ,  Urgent Care  Prepaid Health Plan  This subscriber receives MA12 - Prepaid Medical Assistance Program (PMAP) delivered through [facility]. The phone numbers are: [phone] (metro) or [phone] (toll free).    Referral/Screening:    Financial Resource Worker Screening:     [facility] Benefits:  Is patient requesting help applying for [facility] benefits?: Yes  Have you recently applied for any [facility] benefits?: Yes  What was applied for? : SNAP  Application date:: 11/1/2021  How many people in your household?: 4  Do you buy/eat food together?: Yes  What is the monthly gross income for the household (wages, social security, workers comp, and pension)? : 2222     [place]:  [place] Yes  Is this an insurance renewal?: No  Is this a new insurance application request?: No     Any other information for the FRW?: Family already has a case number which is [id number]. I helped her complete a household report form for November and faxed this in early December, but that didn't seem to change anything as they did not receive benefits for the month of December. Spouse is the only one working. She is also pregnant.    Financial Resource Worker Follow Up    Goals:   Goals Addressed as of 2/3/2022 at 8:48 AM                    Today    1/10/22       1. FRW (pt-stated)   100%  40%    Added 12/30/21 by Audelia Fields, LSW      Goal statement: I need assistance contacting [facility] to resume my MFIP and SNAP benefits within the next 30 days.     Date goal set: 12/30/21  Barriers: Language barrier, food insecurity  Strengths: Accepting of support  Date to achieve by: 1/30/22  Patient expressed understanding of goal: Yes    Action steps to achieve this goal:  1.  I will continue to work with FRW regarding regarding [facility] benefits.  2.  I will provide accurate information about my income, household size, etc. to ensure that my eligibility for benefits is determined appropriately.     NOTE:  [facility] Case #[id number], Worker: Edy MCCLAIN Ph: [phone]    Goal update: 1/06/2022            Intervention and Education during outreach: n/a    FRW Plan: n/a

## 2022-02-09 ENCOUNTER — PATIENT OUTREACH (OUTPATIENT)
Dept: NURSING | Facility: CLINIC | Age: 37
End: 2022-02-09
Payer: COMMERCIAL

## 2022-02-09 NOTE — PROGRESS NOTES
Clinic Care Coordination Contact    Community Health Worker Follow Up    Care Gaps:   Health Maintenance Due   Topic Date Due     PREVENTIVE CARE VISIT  Never done     MATERNAL SCREENING  Never done     REPEAT ANTIBODY SCREEN (OB)  01/15/2022     COVID-19 Vaccine (2 - Pfizer 3-dose series) 02/07/2022     PCP to address other care gaps with patient post delivery.  Goals:   Goals Addressed as of 2/9/2022 at 10:08 AM                    Today    1/6/22       3. Carseat (pt-stated)   40%  30%    Added 12/6/21 by Audelia Fields LSW      Goal statement: I would like to obtain a carseat through [employer] prior to my SALLIE of 4/9/22.    Date goal set: 12/6/21  Barriers: Language barrier, food insecurity  Strengths: Accepting of support  Date to achieve by: 4/9/22  Patient expressed understanding of goal: Yes    Action steps to achievethis goal:  1.  I will answer the phone when [employer] contacts me 4-6 weeks prior to my SALLIE (4/9/22) to arrange carseat delivery.  2.  I will communicate with CHW at outreach.    NOTE: Carseat referral placed by CHW Mishel Meredith on 11/4/21 and pending.     Updated: 2/09/2022.        Intervention and Education during outreach:  -CHW reminded patient of upcoming OB follow up appointments in the clinic.  -Patient is wondering about baby carseat and CHW informed patient that [employer] will call and deliver it to her 4-6 weeks prior to deliver and CHW will follow up.  -Patient is receiving SNAP and County benefits with no issue.  -CHW informed patient to call for additional coordination assistance when needed.       CHW Next Outreach: In one month.    IV discontinued, cath removed intact

## 2022-02-14 ENCOUNTER — PRENATAL OFFICE VISIT (OUTPATIENT)
Dept: FAMILY MEDICINE | Facility: CLINIC | Age: 37
End: 2022-02-14
Attending: FAMILY MEDICINE
Payer: COMMERCIAL

## 2022-02-14 VITALS
OXYGEN SATURATION: 98 % | RESPIRATION RATE: 16 BRPM | SYSTOLIC BLOOD PRESSURE: 90 MMHG | BODY MASS INDEX: 26.86 KG/M2 | HEART RATE: 78 BPM | DIASTOLIC BLOOD PRESSURE: 56 MMHG | WEIGHT: 142.25 LBS | TEMPERATURE: 98.3 F | HEIGHT: 61 IN

## 2022-02-14 DIAGNOSIS — Z34.90 PREGNANCY, UNSPECIFIED GESTATIONAL AGE: Primary | ICD-10-CM

## 2022-02-14 DIAGNOSIS — Z98.891 HISTORY OF CESAREAN SECTION: ICD-10-CM

## 2022-02-14 DIAGNOSIS — Z23 HIGH PRIORITY FOR 2019-NCOV VACCINE: ICD-10-CM

## 2022-02-14 PROCEDURE — 91305 COVID-19,PF,PFIZER (12+ YRS): CPT | Performed by: FAMILY MEDICINE

## 2022-02-14 PROCEDURE — 99207 PR PRENATAL VISIT: CPT | Performed by: FAMILY MEDICINE

## 2022-02-14 PROCEDURE — 0052A COVID-19,PF,PFIZER (12+ YRS): CPT | Performed by: FAMILY MEDICINE

## 2022-02-14 ASSESSMENT — MIFFLIN-ST. JEOR: SCORE: 1273.65

## 2022-02-14 NOTE — PROGRESS NOTES
"SUBJECTIVE:   at 32w2d by 18 weeks US. Estimated Date of Delivery: 2022.  She is doing well. She denies vaginal bleeding, leakage of fluid, or urinary symptoms. Fetal movement is present. She is not having contractions.  Concerns: None  ROS  Negative except as noted above in HPI  OBJECTIVE:  Blood pressure 90/56, pulse 78, temperature 98.3  F (36.8  C), temperature source Oral, resp. rate 16, height 1.543 m (5' 0.75\"), weight 64.5 kg (142 lb 4 oz), last menstrual period 2021, SpO2 98 %.  Gen: Comfortable, no acute distress.  Abd: Gravid, non-tender  See OB Vitals flowsheet.  Transverse on exam today  ASSESSMENT/PLAN:  Mu was seen today for prenatal care and imm/inj.    Diagnoses and all orders for this visit:    Pregnancy, unspecified gestational age    History of  section    High priority for 2019-nCoV vaccine  -     COVID-19,PF,PFIZER (12+ Yrs GRAY LABEL)    Other orders  -     PFIZER COVID-19 VACCINE 2ND DOSE APPT      -IUP at 32w2d:     Prenatal labs reviewed     Transverse presentation on exam, head maternal right. Continue to monitor position.    RTC in 2 weeks or sooner with problems. Bedside US    Visit completed along with assistance of Cynthia .    Marisa Collier MD    "

## 2022-02-21 ENCOUNTER — PRENATAL OFFICE VISIT (OUTPATIENT)
Dept: FAMILY MEDICINE | Facility: CLINIC | Age: 37
End: 2022-02-21
Attending: FAMILY MEDICINE
Payer: COMMERCIAL

## 2022-02-21 VITALS
RESPIRATION RATE: 16 BRPM | WEIGHT: 141 LBS | HEIGHT: 61 IN | HEART RATE: 79 BPM | BODY MASS INDEX: 26.62 KG/M2 | SYSTOLIC BLOOD PRESSURE: 92 MMHG | OXYGEN SATURATION: 97 % | DIASTOLIC BLOOD PRESSURE: 60 MMHG | TEMPERATURE: 98 F

## 2022-02-21 DIAGNOSIS — Z98.891 HISTORY OF CESAREAN SECTION: ICD-10-CM

## 2022-02-21 DIAGNOSIS — Z34.90 PREGNANCY, UNSPECIFIED GESTATIONAL AGE: Primary | ICD-10-CM

## 2022-02-21 PROCEDURE — 99207 PR PRENATAL VISIT: CPT | Performed by: FAMILY MEDICINE

## 2022-02-21 NOTE — PROGRESS NOTES
"SUBJECTIVE:   at 33w2d. Estimated Date of Delivery: 2022.  She is doing well. She denies vaginal bleeding, leakage of fluid, or urinary symptoms. Fetal movement is present. She is not having contractions.  Concerns: None  ROS  Negative except as noted above in HPI  OBJECTIVE:  Blood pressure 92/60, pulse 79, temperature 98  F (36.7  C), temperature source Oral, resp. rate 16, height 1.543 m (5' 0.75\"), weight 64 kg (141 lb), last menstrual period 2021, SpO2 97 %.  Gen: Comfortable, no acute distress.  Abd: Gravid, non-tender  See OB Vitals flowsheet.  Bedside US: transverse, head maternal right  ASSESSMENT/PLAN:  Mu was seen today for prenatal care.    Diagnoses and all orders for this visit:    Pregnancy, unspecified gestational age    History of  section: Transverse position today with head maternal right. Reviewed options if this persists, including ECV vs repeat C/S- she would likely want to try ECV.      -IUP at 33w2d:     Prenatal labs reviewed    RTC in 2 weeks or sooner with problems. Bedside US    Visit completed along with assistance of Cynthia .    Marisa Collier MD    "

## 2022-03-04 ENCOUNTER — MEDICAL CORRESPONDENCE (OUTPATIENT)
Dept: HEALTH INFORMATION MANAGEMENT | Facility: CLINIC | Age: 37
End: 2022-03-04

## 2022-03-04 ENCOUNTER — PRENATAL OFFICE VISIT (OUTPATIENT)
Dept: FAMILY MEDICINE | Facility: CLINIC | Age: 37
End: 2022-03-04
Attending: FAMILY MEDICINE
Payer: COMMERCIAL

## 2022-03-04 VITALS
HEIGHT: 61 IN | WEIGHT: 143.75 LBS | OXYGEN SATURATION: 98 % | HEART RATE: 84 BPM | RESPIRATION RATE: 16 BRPM | BODY MASS INDEX: 27.14 KG/M2 | TEMPERATURE: 98 F | SYSTOLIC BLOOD PRESSURE: 96 MMHG | DIASTOLIC BLOOD PRESSURE: 58 MMHG

## 2022-03-04 DIAGNOSIS — O32.2XX0 TRANSVERSE LIE OF FETUS, SINGLE OR UNSPECIFIED FETUS: Primary | ICD-10-CM

## 2022-03-04 DIAGNOSIS — Z34.90 PREGNANCY, UNSPECIFIED GESTATIONAL AGE: ICD-10-CM

## 2022-03-04 DIAGNOSIS — Z98.891 HISTORY OF CESAREAN SECTION: ICD-10-CM

## 2022-03-04 PROCEDURE — 99207 PR PRENATAL VISIT: CPT | Performed by: FAMILY MEDICINE

## 2022-03-10 ENCOUNTER — PRENATAL OFFICE VISIT (OUTPATIENT)
Dept: FAMILY MEDICINE | Facility: CLINIC | Age: 37
End: 2022-03-10
Payer: COMMERCIAL

## 2022-03-10 VITALS
HEIGHT: 61 IN | DIASTOLIC BLOOD PRESSURE: 56 MMHG | TEMPERATURE: 98.3 F | WEIGHT: 145.5 LBS | SYSTOLIC BLOOD PRESSURE: 90 MMHG | BODY MASS INDEX: 27.47 KG/M2 | HEART RATE: 82 BPM | OXYGEN SATURATION: 98 % | RESPIRATION RATE: 16 BRPM

## 2022-03-10 DIAGNOSIS — O26.849 UTERINE SIZE-DATE DISCREPANCY, ANTEPARTUM: ICD-10-CM

## 2022-03-10 DIAGNOSIS — Z34.90 PREGNANCY, UNSPECIFIED GESTATIONAL AGE: Primary | ICD-10-CM

## 2022-03-10 DIAGNOSIS — R30.0 DYSURIA: ICD-10-CM

## 2022-03-10 DIAGNOSIS — Z98.891 HISTORY OF CESAREAN SECTION: ICD-10-CM

## 2022-03-10 LAB
ALBUMIN UR-MCNC: NEGATIVE MG/DL
APPEARANCE UR: CLEAR
BILIRUB UR QL STRIP: NEGATIVE
COLOR UR AUTO: YELLOW
GLUCOSE UR STRIP-MCNC: NEGATIVE MG/DL
HGB UR QL STRIP: NEGATIVE
KETONES UR STRIP-MCNC: NEGATIVE MG/DL
LEUKOCYTE ESTERASE UR QL STRIP: NEGATIVE
NITRATE UR QL: NEGATIVE
PH UR STRIP: 8.5 [PH] (ref 5–7)
SP GR UR STRIP: 1.02 (ref 1–1.03)
UROBILINOGEN UR STRIP-ACNC: 0.2 E.U./DL

## 2022-03-10 PROCEDURE — 87653 STREP B DNA AMP PROBE: CPT | Performed by: FAMILY MEDICINE

## 2022-03-10 PROCEDURE — 81003 URINALYSIS AUTO W/O SCOPE: CPT | Performed by: FAMILY MEDICINE

## 2022-03-10 PROCEDURE — 99207 PR PRENATAL VISIT: CPT | Performed by: FAMILY MEDICINE

## 2022-03-10 NOTE — PROGRESS NOTES
"SUBJECTIVE:   at 35w5d. Estimated Date of Delivery: 2022.  She is doing well. She denies vaginal bleeding, leakage of fluid, or urinary symptoms. Fetal movement is present- she has felt lots of movement and she used to not be able to sleep on her right side since that was where the baby's head was located but she has now been able to sleep on her right side. She is not having contractions.  Concerns: She went to ECV appointment at [facility] but it was rescheduled to 3/18 and she does not have transportation.  ROS  Negative except as noted above in HPI  OBJECTIVE:  Blood pressure 90/56, pulse 82, temperature 98.3  F (36.8  C), temperature source Oral, resp. rate 16, height 1.543 m (5' 0.75\"), weight 66 kg (145 lb 8 oz), last menstrual period 2021, SpO2 98 %.  Gen: Comfortable, no acute distress.  Abd: Gravid, non-tender  See OB Vitals flowsheet.  ASSESSMENT/PLAN:  Mu was seen today for prenatal care.    Diagnoses and all orders for this visit:    Pregnancy, unspecified gestational age  -     Group B strep PCR    History of  section: TOLAC consent completed.  -     Group B strep PCR    Dysuria: Check UA/UC  -     UA Macro with Reflex to Micro and Culture - lab collect; Future    Uterine size-date discrepancy, antepartum: Bedside US showed cephalic presentation today. Assisted patient with canceling her appointment for ECV consult with [facility] and will schedule growth US since fundal height>dates.  -     US OB > 14 Weeks; Future      -IUP at 35w5d:     Prenatal labs reviewed     RTC in 1 weeks or sooner with problems.    Visit completed along with assistance of Cynthia     Marisa Collier MD    "

## 2022-03-11 LAB — GP B STREP DNA SPEC QL NAA+PROBE: NEGATIVE

## 2022-03-14 ENCOUNTER — PRENATAL OFFICE VISIT (OUTPATIENT)
Dept: FAMILY MEDICINE | Facility: CLINIC | Age: 37
End: 2022-03-14
Attending: FAMILY MEDICINE
Payer: COMMERCIAL

## 2022-03-14 VITALS
DIASTOLIC BLOOD PRESSURE: 62 MMHG | RESPIRATION RATE: 16 BRPM | OXYGEN SATURATION: 98 % | WEIGHT: 145.31 LBS | BODY MASS INDEX: 27.43 KG/M2 | HEART RATE: 81 BPM | HEIGHT: 61 IN | TEMPERATURE: 98.3 F | SYSTOLIC BLOOD PRESSURE: 94 MMHG

## 2022-03-14 DIAGNOSIS — Z98.891 HISTORY OF CESAREAN SECTION: ICD-10-CM

## 2022-03-14 DIAGNOSIS — Z34.90 PREGNANCY, UNSPECIFIED GESTATIONAL AGE: Primary | ICD-10-CM

## 2022-03-14 PROBLEM — O32.2XX0 TRANSVERSE LIE OF FETUS, SINGLE OR UNSPECIFIED FETUS: Status: RESOLVED | Noted: 2022-03-04 | Resolved: 2022-03-14

## 2022-03-14 PROCEDURE — 99207 PR PRENATAL VISIT: CPT | Performed by: FAMILY MEDICINE

## 2022-03-14 NOTE — PROGRESS NOTES
"SUBJECTIVE:   at 36w2d. Estimated Date of Delivery: 2022.  She is doing well. She denies vaginal bleeding, leakage of fluid, or urinary symptoms. Fetal movement is present. She is not having contractions.  Concerns: None.  ROS  Negative except as noted above in HPI  OBJECTIVE:  Blood pressure 94/62, pulse 81, temperature 98.3  F (36.8  C), temperature source Oral, resp. rate 16, height 1.543 m (5' 0.75\"), weight 65.9 kg (145 lb 5 oz), last menstrual period 2021, SpO2 98 %.  Gen: Comfortable, no acute distress.  Abd: Gravid, non-tender  See OB Vitals flowsheet.  Bedside US: cephalic presentation (previously transverse)  ASSESSMENT/PLAN:  Mu was seen today for prenatal care.    Diagnoses and all orders for this visit:    Pregnancy, unspecified gestational age    History of  section: TOLAC consent completed. Cephalic presentation today (previously transverse). Size>dates- has growth US scheduled this week.      -IUP at 36w2d:     Prenatal labs reviewed     GBS status is negative- reviewed result    Hospital form given- reviewed how to get to hospital, Covid-19 screening, visitor policy    RTC in 1 weeks or sooner with problems    Visit completed along with assistance of Cynthia .    Marisa Collier MD    "

## 2022-03-16 ENCOUNTER — PATIENT OUTREACH (OUTPATIENT)
Dept: CARE COORDINATION | Facility: CLINIC | Age: 37
End: 2022-03-16

## 2022-03-16 ENCOUNTER — HOSPITAL ENCOUNTER (OUTPATIENT)
Dept: ULTRASOUND IMAGING | Facility: CLINIC | Age: 37
Discharge: HOME | End: 2022-03-16
Attending: FAMILY MEDICINE | Admitting: FAMILY MEDICINE
Payer: COMMERCIAL

## 2022-03-16 DIAGNOSIS — O26.849 UTERINE SIZE-DATE DISCREPANCY, ANTEPARTUM: ICD-10-CM

## 2022-03-16 DIAGNOSIS — Z71.89 COMPLEX CARE COORDINATION: Primary | ICD-10-CM

## 2022-03-16 PROCEDURE — 76816 OB US FOLLOW-UP PER FETUS: CPT

## 2022-03-16 NOTE — LETTER
[facility]  Patient Centered Plan of Care  About Me:        Patient Name:  Alexander Verdugo    YOB: 1985  Age:         37 year old   [place] MRN:    [id number] Telephone Information:  Home Phone [phone]   Mobile [phone]       Address:  [address]  [place] [address] Email address:  No e-mail address on record      Emergency Contact(s)    Name Relationship Lgl Grd Work Phone Home Phone Mobile Phone   1. SUDHA ESPINOSA Spouse    [phone]           Primary language:  Cynthia     needed? Yes   [employer]:  [phone] op. 1  Other communication barriers:Language barrier    Preferred Method of Communication:     Current living arrangement: I live in a private home with family    Mobility Status/ Medical Equipment: Independent        Health Maintenance  Health Maintenance Reviewed: Due/Overdue      My Access Plan  Medical Emergency 911   Primary Clinic Line [facility] - [phone]   24 Hour Appointment Line [phone] or  [phone]) (toll-free)   24 Hour Nurse Line [phone] (toll-free)   Preferred Urgent Care [facility] [phone]     Preferred Hospital [facility]  [phone]     Preferred Pharmacy [facility] - [place], [place] - [address] [place] [address] Pkwy     Behavioral Health Crisis Line The National Suicide Prevention Lifeline at 1-115.691.4335 or 911             My Care Team Members  Patient Care Team       Relationship Specialty Notifications Start End    Marisa Collire MD PCP - General Family Medicine  3/3/22     Phone: [phone] Fax: [phone]         [address] [address]    Marisa Collier MD Assigned PCP   9/17/21     Phone: [phone] Fax: [phone]         [address] [address]    Audelia Fields LSW Lead Care Coordinator  - Clinical Admissions 12/6/21     Jaya Rao  Health Worker Primary Care - CC  21     Phone: [phone] Fax: [phone]         [address] [place] [id number]            My Care Plans  Self Management and Treatment Plan  Goals and (Comments)  Goals        General     1. Add  to health insurance (pt-stated)      Notes - Note created  3/16/2022 10:41 AM by Jaya Rao     Goal Statement: I would like assistance adding my new born for health insurance in the next 3 months (once born).    Date goal set: 3/16/2022  Barriers: Language barrier and lack of communicty resources.   Strengths: Accepting of support  Date to achieve by: TBD once baby is born.   Patient expressed understanding of goal: Yes    Action steps to achieve this goal:  1. I will call and let CHW know once the baby is born.   2. I will answer my phone as CHW to call and follow up with me.  3. I will continue to follow up with PCP/OB as scheduled and recommended.               Action Plans on File:                       Advance Care Plans/Directives Type:   No data recorded    My Medical and Care Information  Problem List   Patient Active Problem List   Diagnosis     History of  section     Pregnancy, unspecified gestational age     Nausea and vomiting during pregnancy      Current Medications and Allergies:  See printed Medication Report.    Care Coordination Start Date: 12/3/2021   Frequency of Care Coordination: monthly     Form Last Updated: 2022

## 2022-03-16 NOTE — PROGRESS NOTES
Clinic Care Coordination Contact     Situation: 45 day chart review completed by SW care coordinator.     Background:   - Most recent SW assessment completed on 12/3/21. Most recent Complex Care Plan generated today.  - Initially enrolled for pregnancy/baby resources.   - Pt was 21w6d pregnant with an SALLIE of 22 at time of enrollment. OB provider = Amira.  - Carseat referral to [email or web address] submitted on 21.  - Last seen in-clinic by OB provider (Amira) on 3/14/22. Next routine prenatal visit is scheduled for 3/23/22 at 9am.  - Last outreach by CHW on 3/16/22 (today). Pt reported that she received infant carseat from [employer]. CHW to f/u on a monthly basis throughout the remainder of her pregnancy. FRW to assist with process of adding  to health insurance postpartum.     Assessment: Recent outreaches, active goals, current care plan, and pertinent office visit notes reviewed. Chart review to be completed by SW every 45 days and updated Complex Care Plan to be generated every 90 days during continued course of [facility] enrollment.      Plan:  1.) Continue scheduled outreach.

## 2022-03-23 ENCOUNTER — PRENATAL OFFICE VISIT (OUTPATIENT)
Dept: FAMILY MEDICINE | Facility: CLINIC | Age: 37
End: 2022-03-23
Payer: COMMERCIAL

## 2022-03-23 VITALS
TEMPERATURE: 98.1 F | OXYGEN SATURATION: 99 % | SYSTOLIC BLOOD PRESSURE: 92 MMHG | RESPIRATION RATE: 16 BRPM | HEART RATE: 87 BPM | WEIGHT: 147 LBS | BODY MASS INDEX: 27.75 KG/M2 | HEIGHT: 61 IN | DIASTOLIC BLOOD PRESSURE: 60 MMHG

## 2022-03-23 DIAGNOSIS — Z34.90 PREGNANCY, UNSPECIFIED GESTATIONAL AGE: Primary | ICD-10-CM

## 2022-03-23 DIAGNOSIS — Z98.891 HISTORY OF CESAREAN SECTION: ICD-10-CM

## 2022-03-23 PROCEDURE — 99207 PR PRENATAL VISIT: CPT | Performed by: FAMILY MEDICINE

## 2022-03-23 NOTE — PROGRESS NOTES
"SUBJECTIVE:   at 37w4d. Estimated Date of Delivery: 2022.  She is doing well. She denies vaginal bleeding, leakage of fluid, or urinary symptoms. Fetal movement is present. She is having contractions.  Concerns: tightening of the abdomen on and off- mostly at night. Nothing painful or regular. Feels the baby has moved down.    ROS  Negative except as noted above in HPI  OBJECTIVE:  Blood pressure 92/60, pulse 87, temperature 98.1  F (36.7  C), temperature source Oral, resp. rate 16, height 1.543 m (5' 0.75\"), weight 66.7 kg (147 lb), last menstrual period 2021, SpO2 99 %.  Gen: Comfortable, no acute distress.  Abd: Gravid, non-tender  See OB Vitals flowsheet.  ASSESSMENT/PLAN:  Mu was seen today for prenatal care.    Diagnoses and all orders for this visit:    Pregnancy, unspecified gestational age    History of  section: TOLAC consent completed. Growth US normal, except abdominal circumference at upper border of normal.      -IUP at 37w4d:     Prenatal labs reviewed     Reviewed how to get to hospital. Has hospital form. Reviewed I will be out of town this weekend, reviewed on-call coverage    RTC in 1 weeks or sooner with problems.    Visit completed along with assistance of Cynthia .    Marisa Collier MD      "

## 2022-03-30 ENCOUNTER — PRENATAL OFFICE VISIT (OUTPATIENT)
Dept: FAMILY MEDICINE | Facility: CLINIC | Age: 37
End: 2022-03-30
Payer: COMMERCIAL

## 2022-03-30 VITALS
SYSTOLIC BLOOD PRESSURE: 94 MMHG | TEMPERATURE: 97.8 F | HEIGHT: 61 IN | RESPIRATION RATE: 16 BRPM | OXYGEN SATURATION: 98 % | BODY MASS INDEX: 27.85 KG/M2 | DIASTOLIC BLOOD PRESSURE: 60 MMHG | HEART RATE: 90 BPM | WEIGHT: 147.5 LBS

## 2022-03-30 DIAGNOSIS — Z34.90 PREGNANCY, UNSPECIFIED GESTATIONAL AGE: Primary | ICD-10-CM

## 2022-03-30 PROCEDURE — 99207 PR PRENATAL VISIT: CPT | Performed by: FAMILY MEDICINE

## 2022-03-30 NOTE — PROGRESS NOTES
"SUBJECTIVE:   at 38w4d. Estimated Date of Delivery: 2022.  She is doing well. She denies vaginal bleeding, leakage of fluid, or urinary symptoms. Fetal movement is present. She is not having contractions.  Concerns: Having some mild cramping at night.  ROS  Negative except as noted above in HPI  OBJECTIVE:  Blood pressure 94/60, pulse 90, temperature 97.8  F (36.6  C), temperature source Oral, resp. rate 16, height 1.543 m (5' 0.75\"), weight 66.9 kg (147 lb 8 oz), last menstrual period 2021, SpO2 98 %.  Gen: Comfortable, no acute distress.  Abd: Gravid, non-tender  See OB Vitals flowsheet.  ASSESSMENT/PLAN:  Alexander was seen today for prenatal care.    Diagnoses and all orders for this visit:    Pregnancy, unspecified gestational age      -IUP at 38w4d:     Prenatal labs reviewed     History of C/S- TOLAC consent completed. Growth US normal but AC upper border of normal.    RTC in 1 weeks or sooner with problems. Check cervix next visit    Visit completed along with assistance of Cynthia .    Marisa Collier MD    "

## 2022-04-04 ENCOUNTER — PRENATAL OFFICE VISIT (OUTPATIENT)
Dept: FAMILY MEDICINE | Facility: CLINIC | Age: 37
End: 2022-04-04
Payer: COMMERCIAL

## 2022-04-04 VITALS
BODY MASS INDEX: 28.13 KG/M2 | HEART RATE: 84 BPM | DIASTOLIC BLOOD PRESSURE: 58 MMHG | WEIGHT: 149 LBS | HEIGHT: 61 IN | SYSTOLIC BLOOD PRESSURE: 92 MMHG | RESPIRATION RATE: 16 BRPM | OXYGEN SATURATION: 98 % | TEMPERATURE: 98 F

## 2022-04-04 DIAGNOSIS — Z34.90 PREGNANCY, UNSPECIFIED GESTATIONAL AGE: Primary | ICD-10-CM

## 2022-04-04 DIAGNOSIS — Z98.891 HISTORY OF CESAREAN SECTION: ICD-10-CM

## 2022-04-04 PROCEDURE — 99207 PR PRENATAL VISIT: CPT | Performed by: FAMILY MEDICINE

## 2022-04-04 NOTE — PROGRESS NOTES
"SUBJECTIVE:   at 39w2d. Estimated Date of Delivery: 2022.  She is doing well. She denies vaginal bleeding, leakage of fluid, or urinary symptoms. Fetal movement is present. She is having contractions at night mostly- stronger last night but resolved this morning.  Concerns: None.  ROS  Negative except as noted above in HPI  OBJECTIVE:  Blood pressure 92/58, pulse 84, temperature 98  F (36.7  C), temperature source Oral, resp. rate 16, height 1.543 m (5' 0.75\"), weight 67.6 kg (149 lb), last menstrual period 2021, SpO2 98 %.  Gen: Comfortable, no acute distress.  Abd: Gravid, non-tender  See OB Vitals flowsheet.  ASSESSMENT/PLAN:  Alexander was seen today for prenatal care.    Diagnoses and all orders for this visit:    Pregnancy, unspecified gestational age    History of  section: TOLAC consent completed.     Other orders  -     REVIEW OF HEALTH MAINTENANCE PROTOCOL ORDERS      -IUP at 39w2d:     Prenatal labs reviewed.     Reviewed plans for getting to hospital- has hospital form at home    RTC in 1 weeks or sooner with problems. NST next visit    Visit completed along with assistance of Cynthia .    Marisa Collier MD    "

## 2022-04-11 ENCOUNTER — HOSPITAL ENCOUNTER (OUTPATIENT)
Dept: ULTRASOUND IMAGING | Facility: HOSPITAL | Age: 37
Discharge: HOME | End: 2022-04-11
Attending: FAMILY MEDICINE | Admitting: FAMILY MEDICINE
Payer: COMMERCIAL

## 2022-04-11 ENCOUNTER — PRENATAL OFFICE VISIT (OUTPATIENT)
Dept: FAMILY MEDICINE | Facility: CLINIC | Age: 37
End: 2022-04-11
Payer: COMMERCIAL

## 2022-04-11 VITALS
HEIGHT: 61 IN | RESPIRATION RATE: 16 BRPM | BODY MASS INDEX: 27.75 KG/M2 | WEIGHT: 147 LBS | TEMPERATURE: 98.8 F | SYSTOLIC BLOOD PRESSURE: 92 MMHG | DIASTOLIC BLOOD PRESSURE: 66 MMHG | HEART RATE: 72 BPM

## 2022-04-11 DIAGNOSIS — O48.0 POST-TERM PREGNANCY, 40-42 WEEKS OF GESTATION: Primary | ICD-10-CM

## 2022-04-11 DIAGNOSIS — O28.8 NON-REACTIVE NST (NON-STRESS TEST): ICD-10-CM

## 2022-04-11 DIAGNOSIS — O48.0 POST-TERM PREGNANCY, 40-42 WEEKS OF GESTATION: ICD-10-CM

## 2022-04-11 PROCEDURE — 99207 PR PRENATAL VISIT: CPT | Performed by: FAMILY MEDICINE

## 2022-04-11 PROCEDURE — 76819 FETAL BIOPHYS PROFIL W/O NST: CPT

## 2022-04-11 NOTE — PROGRESS NOTES
"SUBJECTIVE:   at 40w2d. Estimated Date of Delivery: 2022.  She is doing well. She denies vaginal bleeding, leakage of fluid, or urinary symptoms. Fetal movement is prsent. She is not having contractions.  Concerns: None  ROS  Negative except as noted above in HPI  OBJECTIVE:  Blood pressure 92/66, pulse 72, temperature 98.8  F (37.1  C), temperature source Oral, resp. rate 16, height 1.543 m (5' 0.75\"), weight 66.7 kg (147 lb), last menstrual period 2021.  Gen: Comfortable, no acute distress.  Abd: Gravid, non-tender  See OB Vitals flowsheet.  NST: Baseline 135, moderate variability, 10x10 accels but no 15x15, no decels  [place]: quiet  NONREACTIVE NST  ASSESSMENT/PLAN:  Mu was seen today for prenatal care.    Diagnoses and all orders for this visit:    Post-term pregnancy, 40-42 weeks of gestation: NST non-reactive. Plan for BPP with EFW later today. If reassuring, reviewed plan for recommended repeat C/S vs induction at 41w0d. Cervix unfavorable today.     Non-reactive NST (non-stress test):   - US Fetal Biophys Prof w/o Non Stress Test; Future    Other orders  -     Fetal Non Stress Test by MD/RN      -IUP at 40w2d:     Prenatal labs reviewed.     Discussed options with patient including repeat C/S vs waiting to see if labor starts by 41 weeks. Patient elects to wait until 22 and prefers me to call her on Wednesday to discuss next steps.     Visit completed along with assistance of Cynthia .    Marisa Collier MD    "

## 2022-04-12 ENCOUNTER — HOSPITAL ENCOUNTER (INPATIENT)
Facility: HOSPITAL | Age: 37
LOS: 1 days | Discharge: HOME HEALTH | End: 2022-04-13
Attending: FAMILY MEDICINE | Admitting: FAMILY MEDICINE
Payer: COMMERCIAL

## 2022-04-12 DIAGNOSIS — O34.219 VBAC, DELIVERED: Primary | ICD-10-CM

## 2022-04-12 DIAGNOSIS — Z59.71 INSURANCE COVERAGE PROBLEMS: ICD-10-CM

## 2022-04-12 DIAGNOSIS — D50.8 IRON DEFICIENCY ANEMIA SECONDARY TO INADEQUATE DIETARY IRON INTAKE: ICD-10-CM

## 2022-04-12 PROBLEM — Z36.89 ENCOUNTER FOR TRIAGE IN PREGNANT PATIENT: Status: ACTIVE | Noted: 2022-04-12

## 2022-04-12 PROBLEM — Z34.90 PREGNANCY, UNSPECIFIED GESTATIONAL AGE: Status: RESOLVED | Noted: 2021-11-04 | Resolved: 2022-04-12

## 2022-04-12 PROBLEM — Z34.90 PREGNANT: Status: ACTIVE | Noted: 2022-04-12

## 2022-04-12 PROBLEM — Z34.90 PREGNANT: Status: RESOLVED | Noted: 2022-04-12 | Resolved: 2022-04-12

## 2022-04-12 PROBLEM — Z36.89 ENCOUNTER FOR TRIAGE IN PREGNANT PATIENT: Status: RESOLVED | Noted: 2022-04-12 | Resolved: 2022-04-12

## 2022-04-12 LAB
ABO/RH(D): NORMAL
ANTIBODY SCREEN: NEGATIVE
HGB BLD-MCNC: 11.5 G/DL (ref 11.7–15.7)
HOLD SPECIMEN: NORMAL
SARS-COV-2 RNA RESP QL NAA+PROBE: NEGATIVE
SPECIMEN EXPIRATION DATE: NORMAL
T PALLIDUM AB SER QL: NONREACTIVE

## 2022-04-12 PROCEDURE — 87635 SARS-COV-2 COVID-19 AMP PRB: CPT | Performed by: FAMILY MEDICINE

## 2022-04-12 PROCEDURE — 86901 BLOOD TYPING SEROLOGIC RH(D): CPT | Performed by: FAMILY MEDICINE

## 2022-04-12 PROCEDURE — 36415 COLL VENOUS BLD VENIPUNCTURE: CPT | Performed by: FAMILY MEDICINE

## 2022-04-12 PROCEDURE — 722N000001 HC LABOR CARE VAGINAL DELIVERY SINGLE

## 2022-04-12 PROCEDURE — 250N000013 HC RX MED GY IP 250 OP 250 PS 637: Performed by: FAMILY MEDICINE

## 2022-04-12 PROCEDURE — 250N000011 HC RX IP 250 OP 636: Performed by: FAMILY MEDICINE

## 2022-04-12 PROCEDURE — 85018 HEMOGLOBIN: CPT | Performed by: FAMILY MEDICINE

## 2022-04-12 PROCEDURE — 86780 TREPONEMA PALLIDUM: CPT | Performed by: FAMILY MEDICINE

## 2022-04-12 PROCEDURE — 120N000001 HC R&B MED SURG/OB

## 2022-04-12 PROCEDURE — 250N000009 HC RX 250: Performed by: FAMILY MEDICINE

## 2022-04-12 PROCEDURE — 999N000016 HC STATISTIC ATTENDANCE AT DELIVERY

## 2022-04-12 PROCEDURE — 999N000157 HC STATISTIC RCP TIME EA 10 MIN

## 2022-04-12 RX ORDER — NALOXONE HYDROCHLORIDE 0.4 MG/ML
0.4 INJECTION, SOLUTION INTRAMUSCULAR; INTRAVENOUS; SUBCUTANEOUS
Status: DISCONTINUED | OUTPATIENT
Start: 2022-04-12 | End: 2022-04-12 | Stop reason: HOSPADM

## 2022-04-12 RX ORDER — CITRIC ACID/SODIUM CITRATE 334-500MG
30 SOLUTION, ORAL ORAL
Status: DISCONTINUED | OUTPATIENT
Start: 2022-04-12 | End: 2022-04-12 | Stop reason: HOSPADM

## 2022-04-12 RX ORDER — IBUPROFEN 800 MG/1
800 TABLET, FILM COATED ORAL
Status: DISCONTINUED | OUTPATIENT
Start: 2022-04-12 | End: 2022-04-13 | Stop reason: HOSPADM

## 2022-04-12 RX ORDER — OXYTOCIN/0.9 % SODIUM CHLORIDE 30/500 ML
100-340 PLASTIC BAG, INJECTION (ML) INTRAVENOUS CONTINUOUS PRN
Status: DISCONTINUED | OUTPATIENT
Start: 2022-04-12 | End: 2022-04-13 | Stop reason: HOSPADM

## 2022-04-12 RX ORDER — CARBOPROST TROMETHAMINE 250 UG/ML
250 INJECTION, SOLUTION INTRAMUSCULAR
Status: DISCONTINUED | OUTPATIENT
Start: 2022-04-12 | End: 2022-04-13 | Stop reason: HOSPADM

## 2022-04-12 RX ORDER — MISOPROSTOL 200 UG/1
800 TABLET ORAL
Status: DISCONTINUED | OUTPATIENT
Start: 2022-04-12 | End: 2022-04-13 | Stop reason: HOSPADM

## 2022-04-12 RX ORDER — ONDANSETRON 4 MG/1
4 TABLET, ORALLY DISINTEGRATING ORAL EVERY 6 HOURS PRN
Status: DISCONTINUED | OUTPATIENT
Start: 2022-04-12 | End: 2022-04-12 | Stop reason: HOSPADM

## 2022-04-12 RX ORDER — OXYTOCIN/0.9 % SODIUM CHLORIDE 30/500 ML
340 PLASTIC BAG, INJECTION (ML) INTRAVENOUS CONTINUOUS PRN
Status: DISCONTINUED | OUTPATIENT
Start: 2022-04-12 | End: 2022-04-13 | Stop reason: HOSPADM

## 2022-04-12 RX ORDER — CARBOPROST TROMETHAMINE 250 UG/ML
250 INJECTION, SOLUTION INTRAMUSCULAR
Status: DISCONTINUED | OUTPATIENT
Start: 2022-04-12 | End: 2022-04-12 | Stop reason: HOSPADM

## 2022-04-12 RX ORDER — MODIFIED LANOLIN
OINTMENT (GRAM) TOPICAL
Status: DISCONTINUED | OUTPATIENT
Start: 2022-04-12 | End: 2022-04-13 | Stop reason: HOSPADM

## 2022-04-12 RX ORDER — OXYTOCIN 10 [USP'U]/ML
10 INJECTION, SOLUTION INTRAMUSCULAR; INTRAVENOUS
Status: DISCONTINUED | OUTPATIENT
Start: 2022-04-12 | End: 2022-04-13 | Stop reason: HOSPADM

## 2022-04-12 RX ORDER — NALOXONE HYDROCHLORIDE 0.4 MG/ML
0.2 INJECTION, SOLUTION INTRAMUSCULAR; INTRAVENOUS; SUBCUTANEOUS
Status: DISCONTINUED | OUTPATIENT
Start: 2022-04-12 | End: 2022-04-12 | Stop reason: HOSPADM

## 2022-04-12 RX ORDER — METOCLOPRAMIDE HYDROCHLORIDE 5 MG/ML
10 INJECTION INTRAMUSCULAR; INTRAVENOUS EVERY 6 HOURS PRN
Status: DISCONTINUED | OUTPATIENT
Start: 2022-04-12 | End: 2022-04-12 | Stop reason: HOSPADM

## 2022-04-12 RX ORDER — MISOPROSTOL 200 UG/1
400 TABLET ORAL
Status: DISCONTINUED | OUTPATIENT
Start: 2022-04-12 | End: 2022-04-13 | Stop reason: HOSPADM

## 2022-04-12 RX ORDER — MISOPROSTOL 200 UG/1
400 TABLET ORAL
Status: DISCONTINUED | OUTPATIENT
Start: 2022-04-12 | End: 2022-04-12 | Stop reason: HOSPADM

## 2022-04-12 RX ORDER — METHYLERGONOVINE MALEATE 0.2 MG/ML
200 INJECTION INTRAVENOUS
Status: DISCONTINUED | OUTPATIENT
Start: 2022-04-12 | End: 2022-04-13 | Stop reason: HOSPADM

## 2022-04-12 RX ORDER — OXYTOCIN/0.9 % SODIUM CHLORIDE 30/500 ML
340 PLASTIC BAG, INJECTION (ML) INTRAVENOUS CONTINUOUS PRN
Status: DISCONTINUED | OUTPATIENT
Start: 2022-04-12 | End: 2022-04-12 | Stop reason: HOSPADM

## 2022-04-12 RX ORDER — OXYTOCIN 10 [USP'U]/ML
10 INJECTION, SOLUTION INTRAMUSCULAR; INTRAVENOUS
Status: DISCONTINUED | OUTPATIENT
Start: 2022-04-12 | End: 2022-04-12 | Stop reason: HOSPADM

## 2022-04-12 RX ORDER — BISACODYL 10 MG
10 SUPPOSITORY, RECTAL RECTAL DAILY PRN
Status: DISCONTINUED | OUTPATIENT
Start: 2022-04-12 | End: 2022-04-13 | Stop reason: HOSPADM

## 2022-04-12 RX ORDER — METOCLOPRAMIDE 10 MG/1
10 TABLET ORAL EVERY 6 HOURS PRN
Status: DISCONTINUED | OUTPATIENT
Start: 2022-04-12 | End: 2022-04-12 | Stop reason: HOSPADM

## 2022-04-12 RX ORDER — IBUPROFEN 800 MG/1
800 TABLET, FILM COATED ORAL EVERY 6 HOURS PRN
Status: DISCONTINUED | OUTPATIENT
Start: 2022-04-12 | End: 2022-04-13 | Stop reason: HOSPADM

## 2022-04-12 RX ORDER — METHYLERGONOVINE MALEATE 0.2 MG/ML
200 INJECTION INTRAVENOUS
Status: DISCONTINUED | OUTPATIENT
Start: 2022-04-12 | End: 2022-04-12 | Stop reason: HOSPADM

## 2022-04-12 RX ORDER — KETOROLAC TROMETHAMINE 30 MG/ML
30 INJECTION, SOLUTION INTRAMUSCULAR; INTRAVENOUS
Status: DISCONTINUED | OUTPATIENT
Start: 2022-04-12 | End: 2022-04-13 | Stop reason: HOSPADM

## 2022-04-12 RX ORDER — PROCHLORPERAZINE MALEATE 10 MG
10 TABLET ORAL EVERY 6 HOURS PRN
Status: DISCONTINUED | OUTPATIENT
Start: 2022-04-12 | End: 2022-04-12 | Stop reason: HOSPADM

## 2022-04-12 RX ORDER — ONDANSETRON 2 MG/ML
4 INJECTION INTRAMUSCULAR; INTRAVENOUS EVERY 6 HOURS PRN
Status: DISCONTINUED | OUTPATIENT
Start: 2022-04-12 | End: 2022-04-12 | Stop reason: HOSPADM

## 2022-04-12 RX ORDER — ACETAMINOPHEN 325 MG/1
650 TABLET ORAL EVERY 4 HOURS PRN
Status: DISCONTINUED | OUTPATIENT
Start: 2022-04-12 | End: 2022-04-13 | Stop reason: HOSPADM

## 2022-04-12 RX ORDER — DOCUSATE SODIUM 100 MG/1
100 CAPSULE, LIQUID FILLED ORAL DAILY
Status: DISCONTINUED | OUTPATIENT
Start: 2022-04-12 | End: 2022-04-13 | Stop reason: HOSPADM

## 2022-04-12 RX ORDER — MISOPROSTOL 200 UG/1
800 TABLET ORAL
Status: DISCONTINUED | OUTPATIENT
Start: 2022-04-12 | End: 2022-04-12 | Stop reason: HOSPADM

## 2022-04-12 RX ORDER — HYDROCORTISONE 2.5 %
CREAM (GRAM) TOPICAL 3 TIMES DAILY PRN
Status: DISCONTINUED | OUTPATIENT
Start: 2022-04-12 | End: 2022-04-13 | Stop reason: HOSPADM

## 2022-04-12 RX ORDER — PROCHLORPERAZINE 25 MG
25 SUPPOSITORY, RECTAL RECTAL EVERY 12 HOURS PRN
Status: DISCONTINUED | OUTPATIENT
Start: 2022-04-12 | End: 2022-04-12 | Stop reason: HOSPADM

## 2022-04-12 RX ORDER — LIDOCAINE 40 MG/G
CREAM TOPICAL
Status: DISCONTINUED | OUTPATIENT
Start: 2022-04-12 | End: 2022-04-12 | Stop reason: HOSPADM

## 2022-04-12 RX ADMIN — IBUPROFEN 800 MG: 800 TABLET ORAL at 17:10

## 2022-04-12 RX ADMIN — ACETAMINOPHEN 650 MG: 325 TABLET ORAL at 04:41

## 2022-04-12 RX ADMIN — DOCUSATE SODIUM 100 MG: 100 CAPSULE, LIQUID FILLED ORAL at 08:30

## 2022-04-12 RX ADMIN — ACETAMINOPHEN 650 MG: 325 TABLET ORAL at 08:30

## 2022-04-12 RX ADMIN — KETOROLAC TROMETHAMINE 30 MG: 30 INJECTION, SOLUTION INTRAMUSCULAR at 02:56

## 2022-04-12 RX ADMIN — IBUPROFEN 800 MG: 800 TABLET ORAL at 10:11

## 2022-04-12 RX ADMIN — Medication 340 ML/HR: at 02:38

## 2022-04-12 ASSESSMENT — ACTIVITIES OF DAILY LIVING (ADL)
CONCENTRATING,_REMEMBERING_OR_MAKING_DECISIONS_DIFFICULTY: NO
DRESSING/BATHING_DIFFICULTY: NO
TOILETING_ISSUES: NO
FALL_HISTORY_WITHIN_LAST_SIX_MONTHS: NO
WEAR_GLASSES_OR_BLIND: NO
DIFFICULTY_EATING/SWALLOWING: NO
WALKING_OR_CLIMBING_STAIRS_DIFFICULTY: NO
CHANGE_IN_FUNCTIONAL_STATUS_SINCE_ONSET_OF_CURRENT_ILLNESS/INJURY: NO
DOING_ERRANDS_INDEPENDENTLY_DIFFICULTY: NO

## 2022-04-12 NOTE — H&P
Maternal Admission H&P  [facility]aternity [facility]  Date of Admission: 2022  Date of Service: 2022    Name      Alexander Verdugo         1985  MRN       [id number]  PCP        Marisa Collier at [facility], [phone].    ________________________________________________________________________    Assessment and Plan:  37 year old  at 40w3d.    Baby AGA. Anticipate .    Group B strep: negative     Spontaneous labor  ________________________________________________________________________    Chief Complaint: labor.    HPI: Alexander Verdugo is a 37 year old woman at 40w3d, based on an Estimated Date of Delivery: 2022. She presents with contractions.  Progressed rapidly.  9 cm when I arrived.  Proceeded to have SROM with meconium.    Patient Active Problem List    Diagnosis Date Noted     , delivered 2022     Priority: Medium     History of  section 2021     Priority: Medium     Nausea and vomiting during pregnancy 2021     Priority: Medium      OB History    Para Term  AB Living   5 2 2 0 2 2   SAB IAB Ectopic Multiple Live Births   2 0 0 0 2      # Outcome Date GA Lbr [place]/2nd Weight Sex Delivery Anes PTL Lv   5 Current            4 SAB 10/18/17     AB, MISSED         Birth Comments: Missed AB s/p D&C   3 Term 06/23/15 39w0d  3.742 kg (8 lb 4 oz) M CS-LTranv EPI  HAI      Birth Comments: [place]      Complications: Fetal Intolerance   2 SAB 14              Birth Comments: Blighted ovum s/p D&C   1 Term     M Vag-Spont   HAI      Birth Comments: [place]      Obstetric Comments   1st living child born in [place]   2nd living child born in [place]     Review of Systems Negative except what is noted in HPI.     No past medical history on file.   Past Surgical History:   Procedure Laterality Date      SECTION  2015    [place] at [facility]   Patient has no known allergies.  Family  History   Problem Relation Age of Onset     Diabetes No family hx of      Hypertension No family hx of      Social History     Socioeconomic History     Marital status:      Spouse name: Gerald Zamarripa     Number of children: 2     Years of education: Not on file     Highest education level: Not on file   Occupational History     Not on file   Tobacco Use     Smoking status: Never Smoker     Smokeless tobacco: Never Used   Substance and Sexual Activity     Alcohol use: Never     Drug use: Never     Sexual activity: Yes     Partners: Male     Birth control/protection: None     Comment: Currently pregnant   Other Topics Concern     Not on file   Social History Narrative    12/2021:        - [place] refugee. Born in [facility]. [place]rived [place]. Moved from [place] to [place] in December 2020. She is not yet a [place] citizen, but has applied.     -  to spouse Gerald Zamarrpia; they have 2 children together and are expecting a 3rd in April 2022.        LIVING SITUATION    - Lives with spouse and 2 children        LANGUAGE(S) SPOKEN    - Cynthia     Social Determinants of Health     Financial Resource Strain: High Risk     Difficulty of Paying Living Expenses: Hard   Food Insecurity: No Food Insecurity     Worried About Running Out of Food in the Last Year: Never true     Ran Out of Food in the Last Year: Never true   Transportation Needs: No Transportation Needs     Lack of Transportation (Medical): No     Lack of Transportation (Non-Medical): No   Physical Activity: Insufficiently Active     Days of Exercise per Week: 3 days     Minutes of Exercise per Session: 30 min   Stress: No Stress Concern Present     Feeling of Stress : Only a little   Social Connections: Moderately Integrated     Frequency of Communication with Friends and Family: More than three times a week     Frequency of Social Gatherings with Friends and Family: More than three times a week     Attends [employer] Services: More than 4  times per year     Active Member of Clubs or Organizations: No     Attends Club or Organization Meetings: Never     Marital Status:    Intimate Partner Violence: Not At Risk     Fear of Current or Ex-Partner: No     Emotionally Abused: No     Physically Abused: No     Sexually Abused: No   Housing Stability: Low Risk      Unable to Pay for Housing in the Last Year: No     Number of Places Lived in the Last Year: 2     Unstable Housing in the Last Year: No     Prior to Admission Medication List  Medications Prior to Admission   Medication Sig Dispense Refill Last Dose     Prenatal Vit-Fe Fumarate-FA (PRENATAL VITAMIN) 27-0.8 MG TABS Take 1 tablet by mouth daily 90 tablet 3       Allergies  No Known Allergies  Immunization History   Administered Date(s) Administered     COVID-19,PF,Pfizer 12+ Yrs (2022 and After) 01/17/2022, 02/14/2022     Influenza Vaccine IM > 6 months Valent IIV4 (Alfuria,Fluzone) 10/04/2021     Tdap (Adacel,Boostrix) 01/17/2022      Physical Exam  No data found.  Wt Readings from Last 1 Encounters:   04/11/22 66.7 kg (147 lb)   Prepregnancy: 63 kg (139 lb), BMI 26.48. Total gain: 3.629 kg (8 lb) (expected gain: 7 kg (15 lb)-11.5 kg (25 lb)).    HEART: RRR, no murmur  LUNGS: CTA bilaterally  Fetal Heart Tones: Baseline 120 bpm, variability moderate (6-25 bpm), no decelerations. Reactive.  CONTRACTIONS:  regular, every 2-4 minutes.  CERVIX: dilation 10 cm and pushing  m, and  station.  FLUID: Thick meconium.  Fetal Presentation: vertex.    Labs    Group B Strep PCR   Date Value Ref Range Status   03/10/2022 Negative Negative Final     Comment:     Presumed negative for Streptococcus agalactiae (Group B Streptococcus) or the number of organisms may be below the limit of detection of the assay.      Antibody Screen   Date Value Ref Range Status   10/04/2021 Negative Negative Final     Hepatitis B Surface Antigen   Date Value Ref Range Status   10/04/2021 Nonreactive Nonreactive Final      Chlamydia trachomatis   Date Value Ref Range Status   10/04/2021 Negative Negative Final     Comment:     A negative result by transcription mediated amplification does not preclude the presence of C. trachomatis infection because results are dependent on proper and adequate collection, absence of inhibitors and sufficient rRNA to be detected.     Neisseria gonorrhoeae   Date Value Ref Range Status   10/04/2021 Negative Negative Final     Comment:     Negative for N. gonorrhoeae rRNA by transcription mediated amplification. A negative result by transcription mediated amplification does not preclude the presence of C. trachomatis infection because results are dependent on proper and adequate collection, absence of inhibitors and sufficient rRNA to be detected.     Hemoglobin   Date Value Ref Range Status   04/12/2022 11.5 (L) 11.7 - 15.7 g/dL Final      Admission on 04/12/2022   Component Date Value Ref Range Status     Hemoglobin 04/12/2022 11.5 (A) 11.7 - 15.7 g/dL Final       used: Leyda.

## 2022-04-12 NOTE — L&D DELIVERY NOTE
VAGINAL DELIVERY NOTE    PRE DELIVERY DIAGNOSIS  1) Intrauterine pregnancy at 40w3d    2) spontaneous labor  3) TOLAC    POST DELIVERY DIAGNOSIS  1) Intrauterine pregnancy at 40w3d Gestational age  2) Delivery of a viable male infant  3) Apgar scores: 8 , 9      4) [place] Birth Weight: 3.78 kg (8 lb 5.3 oz)    5) successful     Labor Complications: None   Additional Complications:      Delivery Type: , Spontaneous     PROBLEM LIST:  Patient Active Problem List   Diagnosis     History of  section     Nausea and vomiting during pregnancy     , delivered       Augmentation No                Induction No                          Monitors: External     Lab Results   Component Value Date    AS Negative 2022    HEPBANG Nonreactive 10/04/2021    CHPCRT Negative 10/04/2021    GCPCRT Negative 10/04/2021    HGB 11.5 (L) 2022     GBS: negative.    ROM: SROM  Fluid Type: Meconium    Labor Analgesia/Anesthesia: none    Cord pH obtained: No  Placenta submitted to Pathology: No    Description of procedure:   37 year old  with PNC w/ Dr. Collier and pregnancy complicated by history of  presented to L&D with labor contractions.  Her hospital course was uncomplicated.  Patient progressed to complete with spontaneous rupture of membranes   Shoulder Dystocia No  Operative Vaginal Delivery No  Infant spontaneously delivered over an 1st degree laceration.   It was very small and no repair was required.  Infant delivered in PROSPER position.  Nuchal cord No      Placenta spontaneously delivered: 2022  2:39 AM  grossly intact with 3 vessel cord.  Infant:  Live, vigorous infant  was handed to  NP.    Delivery was uncomplicated. Interventions included fundal massage and pitocin.    Delivery QBL (mL): 500    Mother and Infant stable.    Benny Hoang MD    2022 2:59 AM

## 2022-04-12 NOTE — PLAN OF CARE
Postpartum assessment WNL. Pain managed with PRN Ibuprofen and Tylenol.    [place] mood assessment and birth certificate completed.     Up independently, voiding spontaneously without difficulty. Attentive to infant.     All assessment, information gathering and education completed with Cynthia moreno.

## 2022-04-13 VITALS
SYSTOLIC BLOOD PRESSURE: 95 MMHG | OXYGEN SATURATION: 97 % | HEART RATE: 64 BPM | RESPIRATION RATE: 18 BRPM | TEMPERATURE: 98.4 F | DIASTOLIC BLOOD PRESSURE: 66 MMHG

## 2022-04-13 PROBLEM — D62 ANEMIA DUE TO BLOOD LOSS, ACUTE: Status: ACTIVE | Noted: 2022-04-13

## 2022-04-13 PROBLEM — D50.9 ANEMIA, IRON DEFICIENCY: Status: ACTIVE | Noted: 2022-04-13

## 2022-04-13 LAB — HGB BLD-MCNC: 9.5 G/DL (ref 11.7–15.7)

## 2022-04-13 PROCEDURE — 36415 COLL VENOUS BLD VENIPUNCTURE: CPT | Performed by: FAMILY MEDICINE

## 2022-04-13 PROCEDURE — 85018 HEMOGLOBIN: CPT | Performed by: FAMILY MEDICINE

## 2022-04-13 PROCEDURE — 250N000013 HC RX MED GY IP 250 OP 250 PS 637: Performed by: FAMILY MEDICINE

## 2022-04-13 PROCEDURE — 99207 PR SUBSEQUENT HOSPITAL CARE FOR MOTHER, 15 MINUTES: CPT | Performed by: FAMILY MEDICINE

## 2022-04-13 RX ORDER — FERROUS GLUCONATE 324(38)MG
324 TABLET ORAL EVERY OTHER DAY
Qty: 100 TABLET | Refills: 0 | Status: SHIPPED | OUTPATIENT
Start: 2022-04-13 | End: 2022-06-08

## 2022-04-13 RX ORDER — FERROUS GLUCONATE 324(38)MG
324 TABLET ORAL EVERY OTHER DAY
Qty: 100 TABLET | Refills: 0 | Status: SHIPPED | OUTPATIENT
Start: 2022-04-13 | End: 2022-04-27

## 2022-04-13 RX ORDER — AMOXICILLIN 250 MG
1 CAPSULE ORAL DAILY PRN
Qty: 50 TABLET | Refills: 1 | Status: SHIPPED | OUTPATIENT
Start: 2022-04-13 | End: 2022-04-27

## 2022-04-13 RX ORDER — IBUPROFEN 600 MG/1
600 TABLET, FILM COATED ORAL EVERY 8 HOURS PRN
Qty: 50 TABLET | Refills: 1 | Status: SHIPPED | OUTPATIENT
Start: 2022-04-13 | End: 2022-06-08

## 2022-04-13 RX ORDER — ACETAMINOPHEN AND CODEINE PHOSPHATE 120; 12 MG/5ML; MG/5ML
0.35 SOLUTION ORAL DAILY
Qty: 84 TABLET | Refills: 4 | Status: SHIPPED | OUTPATIENT
Start: 2022-04-13 | End: 2022-04-27

## 2022-04-13 RX ADMIN — ACETAMINOPHEN 650 MG: 325 TABLET ORAL at 08:46

## 2022-04-13 RX ADMIN — IBUPROFEN 800 MG: 800 TABLET ORAL at 02:36

## 2022-04-13 RX ADMIN — IBUPROFEN 800 MG: 800 TABLET ORAL at 08:49

## 2022-04-13 RX ADMIN — DOCUSATE SODIUM 100 MG: 100 CAPSULE, LIQUID FILLED ORAL at 08:46

## 2022-04-13 RX ADMIN — ACETAMINOPHEN 650 MG: 325 TABLET ORAL at 02:36

## 2022-04-13 RX ADMIN — ACETAMINOPHEN 650 MG: 325 TABLET ORAL at 13:50

## 2022-04-13 NOTE — DISCHARGE INSTRUCTIONS
You have a Home Care nurse visit planned for Thursday, April 14th. The nurse will contact you after discharge to confirm the appointment time. If you do not hear from the nurse by Thursday morning, please call [phone]. Do not schedule a clinic appointment on the same day as home nurse visit.

## 2022-04-13 NOTE — PLAN OF CARE
Problem: Plan of Care - These are the overarching goals to be used throughout the patient stay.    Goal: Optimal Comfort and Wellbeing  4/13/2022 0153 by Irma Dior RN  Outcome: Ongoing, Progressing  4/12/2022 2113 by Irma Dior RN  Outcome: Ongoing, Progressing  Intervention: Provide Person-Centered Care  Recent Flowsheet Documentation  Taken 4/13/2022 0100 by Irma Dior RN  Trust Relationship/Rapport:   questions encouraged   questions answered   reassurance provided   care explained  Taken 4/12/2022 2100 by Irma Dior RN  Trust Relationship/Rapport:   questions encouraged   questions answered   reassurance provided   care explained   Stable Post-partum recovery, Parents independent with self and baby care Irma Dior, RN

## 2022-04-13 NOTE — DISCHARGE SUMMARY
Maternal Discharge Summary  [facility] Maternity Care  Date of Service: 2022    Name      Alexander Verdugo         1985  MRN       [id number]  PCP        Marisa Oakley at [facility], [phone].    ________________________________________________________________________    Assessment:  Alexander Verdugo is a 37 year old now  s/p , spontaneous  at 40w3d on 22.  Breastfeeding going well. Continue prenatal vitamin.   Acute blood loss anemia + iron deficiency. No ongoing bleeding. Plan oral iron twice daily for 6 months.      Discharge Plan:   1. Discharge to Home. Condition at Discharge:  stable  2. Physical activity: Regular.  3. Diet:  Regular.  4. Home care nurse: ordered for 1-2 days from now.  5. Lactation clinic appointment: not indicated.  6. Contraception plan: OCP.  7. Influenza immunization: up to date.   8. Covid-19 immunization: up to date.   9. Tdap immunization: up to date.  10. Follow up with Marisa Oakley in 2 weeks and 6 weeks for routine postpartum care.    Future Appointments   Date Time Provider Department Center   2022  2:00 PM Marisa Collier MD [facility]   2022  3:20 PM [facility]     ________________________________________________________________________    Admission Date:  2022  Discharge Date:  2022  Delivery Date:  22  Gestational Age at Delivery: 40w3d    Principal Diagnosis: Labor and delivery  Delivery type: , Spontaneous     Principal Problem:    , delivered  Active Problems:    Patient is a currently breast-feeding mother    Anemia due to blood loss, acute    Anemia, iron deficiency      Subjective:  Patient denies headache, dizziness, dyspnea, and leg pain. Ambulating and eating.    Discharge Exam:  Patient Vitals for the past 24 hrs:   BP Temp Temp src Pulse Resp SpO2   22 0812 95/66 98.4  F (36.9  C) Oral 64 18 97 %   22 0100 98/52 98.2  F  (36.8  C) Oral 74 18 100 %   04/12/22 1959 98/57 99  F (37.2  C) Oral 78 18 98 %   04/12/22 1628 102/57 98.4  F (36.9  C) Oral 71 18 100 %   04/12/22 1236 104/72 97.9  F (36.6  C) Oral -- 16 100 %     General - alert, comfortable  Heart - RRR, no murmurs  Lungs - CTA bilaterally  Abdomen - fundus firm, nontender, below umbilicus  Extremities - trace edema  Admission on 04/12/2022   Component Date Value Ref Range Status     Treponema Antibody Total 04/12/2022 Nonreactive  Nonreactive Final     Hemoglobin 04/12/2022 11.5 (A) 11.7 - 15.7 g/dL Final     SARS CoV2 PCR 04/12/2022 Negative  Negative Final    NEGATIVE: SARS-CoV-2 (COVID-19) RNA not detected, presumed negative.     ABO/RH(D) 04/12/2022 A POS   Final     Antibody Screen 04/12/2022 Negative  Negative Final     SPECIMEN EXPIRATION DATE 04/12/2022 [id number]   Final     Hold Specimen 04/12/2022 [facility]   Final     Hemoglobin 04/13/2022 9.5 (A) 11.7 - 15.7 g/dL Final      Discharge Medications:   See medication reconciliation.     Completed by:   Di Mojica MD  [facility]  4/13/2022 10:02 AM   used: [place]

## 2022-04-13 NOTE — PROGRESS NOTES
"Outreach Note for EPIC    Mu Thoo  [id number]  1985    Chart reviewed, discharge plan discussed with attending MD and patient's bedside RN, needs assessed. Home care visit ordered, nurse visit planned for Thursday, , Home Care Intake updated by this writer. Follow-up post-delivery appointments with  planned in 2 & 6 weeks at the [facility].    Patient, Alexander, is reported to have support at home, is connected with WIC, and feels ready to discharge today with , \"Troy Drewoo\". Outreach RN will continue to follow and assist if needed with discharge plan. No additional needs identified at this time.    "
Mu discharged home per order, discharge instructions review with ipad  ID [id number], encouraged to keep all f/u appt., all questions answered.  Reviewed all prescribed medication, and encouraged to  medication from listed pharmacy.  Breast pump also offered, declined by Mu, discharge instructions given.  
no

## 2022-04-13 NOTE — PLAN OF CARE
Problem: Plan of Care - These are the overarching goals to be used throughout the patient stay.    Goal: Optimal Comfort and Wellbeing  Outcome: Ongoing, Progressing  Intervention: Provide Person-Centered Care  Recent Flowsheet Documentation  Taken 2022 2100 by Irma Dior, RN  Trust Relationship/Rapport:   questions encouraged   questions answered   reassurance provided   care explained   Thoo Mu recovering well after , Cynthia  used for education and discussing plan of care to patient. Post-partum checks WNL Irma Dior, RN

## 2022-04-14 ENCOUNTER — MEDICAL CORRESPONDENCE (OUTPATIENT)
Dept: HEALTH INFORMATION MANAGEMENT | Facility: CLINIC | Age: 37
End: 2022-04-14
Payer: COMMERCIAL

## 2022-04-22 ENCOUNTER — PATIENT OUTREACH (OUTPATIENT)
Dept: NURSING | Facility: CLINIC | Age: 37
End: 2022-04-22
Payer: COMMERCIAL

## 2022-04-22 DIAGNOSIS — Z71.89 COMPLEX CARE COORDINATION: ICD-10-CM

## 2022-04-22 DIAGNOSIS — Z60.3 IMPAIRED ABILITY TO USE COMMUNITY RESOURCES DUE TO LANGUAGE BARRIER: ICD-10-CM

## 2022-04-22 DIAGNOSIS — Z59.71 INSURANCE COVERAGE PROBLEMS: Primary | ICD-10-CM

## 2022-04-22 PROCEDURE — 99207 PR NO CHARGE NURSE ONLY: CPT

## 2022-04-22 SDOH — SOCIAL STABILITY - SOCIAL INSECURITY: ACCULTURATION DIFFICULTY: Z60.3

## 2022-04-22 NOTE — PROGRESS NOTES
"Clinic Care Coordination Contact    Progress Note:     Alexander Verdugo is a 38yo female who is currently enrolled in [facility].    PRIMARY CONCERN(S) ADDRESSED:    Insurance card discrepancy    Add  to insurance    REFERRALS PLACED:    FRW referral for concerns detailed above    For further detail regarding specific items/topics addressed during today's visit, see below.    1. Complex care coordination, impaired ability to use community resources due to language barrier    2. Insurance coverage problems  New [employer] insurance card has the incorrect spelling of her name (\"Mu Kim\" instead of \"Mu Thoo\"). Needs assistance updating/correcting this, FRW referral sent today. See screening below.    3. Mother of , recent pregnancy  Gave birth to healthy baby boy (Troy Verdugo) on 22. Needs assistance adding  to health insurance, FRW referral sent today. See screening below.    Financial Resource Worker Screening:    County Benefits  Is patient requesting help applying for [facility] benefits?: No  Have you recently applied for any [facility] benefits?: No    [place]:  [place]  Is this an insurance renewal?: No  Is this a new insurance application request?: No    Any other information for the FRW?: Pt's new [employer] card has the incorrect spelling of her name (\"Mu Kim\" instead of \"Mu Thoo\"). She needs help updating/correcting this. She also gave birth to a baby boy on 22 and needs assistance adding him to her family's Mnsure case/getting any outstanding account balance from his birth reprocessed. Thank you!    Plan:  1.) See new/updated goals.  2.) Continue scheduled outreach.     Goals        1. Add  to health insurance (pt-stated)       Goal statement: I would like assistance adding my  to health insurance and reprocessing any outstanding account balance within the next 3 months.    Date goal set: 3/16/22  Barriers: Language barrier and lack of communicty " resources  Strengths: Accepting of support  Date to achieve by: 22  Patient expressed understanding of goal: Yes    Action steps to achieve this goal:  1.  I will answer the phone when FRW calls to assist me in adding my  to health insurance.  2.  I will answer the phone when CHW calls at outreach.         2. Correct name on insurance card (pt-stated)       Goal statement: I would like assistance correcting the spelling of my name on my [employer] card within the next  3 months.    Date goal set: 22  Barriers: Language barrier and lack of communicty resources  Strengths: Accepting of support  Date to achieve by: 22  Patient expressed understanding of goal: Yes    Action steps to achieve this goal:  1.  I will answer the phone when FRW contacts me to assist me in correcting the spelling of my name with [employer].  2.  I will bring my new insurance card to [place] once I receive it.  3.  I will answer the phone when CHW calls at outreach.

## 2022-04-22 NOTE — LETTER
[facility]  Patient Centered Plan of Care  About Me:        Patient Name:  Alexander Verdugo    YOB: 1985  Age:         37 year old   [facility] MRN:    [id number] Telephone Information:  Home Phone [phone]   Mobile [phone]       Address:  [address]  [place] [address] Email address:  No e-mail address on record      Emergency Contact(s)    Name Relationship Lgl Grd Work Phone Home Phone Mobile Phone   1. SUDHA ESPINOSA Spouse    [phone]           Primary language:  Cynthia     needed? Yes   [employer]:  [phone] op. 1  Other communication barriers:Language barrier    Preferred Method of Communication:     Current living arrangement: I live in a private home with family    Mobility Status/ Medical Equipment: Independent        Health Maintenance  Health Maintenance Reviewed: Due/Overdue      My Access Plan  Medical Emergency 911   Primary Clinic Line [facility] - [phone]   24 Hour Appointment Line [phone] or  [phone]) (toll-free)   24 Hour Nurse Line [phone] (toll-free)   Preferred Urgent Care [facility] [phone]     Preferred Hospital [facility]  [phone]     Preferred Pharmacy [facility] - [place], [place] - [address] [place] [address]     Behavioral Health Crisis Line The National Suicide Prevention Lifeline at 1-569.709.1472 or 911             My Care Team Members  Patient Care Team       Relationship Specialty Notifications Start End    Marisa Collier MD PCP - General Family Medicine  4/12/22     Phone: [phone] Fax: [phone]         [address] [address]    Marisa Collier MD Assigned PCP   9/17/21     Phone: [phone] Fax: [phone]         [address] [address]    Audelia Fields LSW Lead Care Coordinator  - Clinical Admissions 12/6/21     Jaya Rao  Health Worker Primary Care -   21     Phone: [phone] Fax: [phone]         [address] [place] [address]            My Care Plans  Self Management and Treatment Plan  Goals and (Comments)   Goals        General     1. Add  to health insurance (pt-stated)      Notes - Note edited  2022  9:40 AM by Audelia Fields LSW     Goal statement: I would like assistance adding my  to health insurance and reprocessing any outstanding account balance within the next 3 months.    Date goal set: 3/16/22  Barriers: Language barrier and lack of communicty resources  Strengths: Accepting of support  Date to achieve by: 22  Patient expressed understanding of goal: Yes    Action steps to achieve this goal:  1.  I will answer the phone when FRW calls to assist me in adding my  to health insurance.  2.  I will answer the phone when CHW calls at outreach.         2. Correct name on insurance card (pt-stated)      Notes - Note edited  2022  9:43 AM by Audelia Fields LSW     Goal statement: I would like assistance correcting the spelling of my name on my [employer] card within the next  3 months.    Date goal set: 22  Barriers: Language barrier and lack of communicty resources  Strengths: Accepting of support  Date to achieve by: 22  Patient expressed understanding of goal: Yes    Action steps to achieve this goal:  1.  I will answer the phone when FRW contacts me to assist me in correcting the spelling of my name with [employer].  2.  I will bring my new insurance card to [place] once I receive it.  3.  I will answer the phone when CHW calls at outreach.               Action Plans on File:                       Advance Care Plans/Directives Type:   No data recorded    My Medical and Care Information  Problem List   Patient Active Problem List   Diagnosis     History of  section     Nausea and vomiting during pregnancy     , delivered     Patient is  a currently breast-feeding mother     Anemia due to blood loss, acute     Anemia, iron deficiency      Current Medications and Allergies:  See printed Medication Report.    Care Coordination Start Date: 12/3/2021   Frequency of Care Coordination: monthly     Form Last Updated: 04/22/2022

## 2022-04-26 ENCOUNTER — PATIENT OUTREACH (OUTPATIENT)
Dept: NURSING | Facility: CLINIC | Age: 37
End: 2022-04-26
Payer: COMMERCIAL

## 2022-04-26 NOTE — PROGRESS NOTES
Clinic Care Coordination Contact    Community Health Worker Follow Up    Care Gaps:   Health Maintenance Due   Topic Date Due     PREVENTIVE CARE VISIT  Never done     Scheduled 2022      Goals:    Goals Addressed as of 2022 at 11:41 AM                    Today    22       1. Add  to health insurance (pt-stated)   20%  10%    Added 3/16/22 by Jaya Rao      Goal statement: I would like assistance adding my  to health insurance and reprocessing any outstanding account balance within the next 3 months.    Date goal set: 3/16/22  Barriers: Language barrier and lack of communicty resources  Strengths: Accepting of support  Date to achieve by: 22  Patient expressed understanding of goal: Yes    Action steps to achieve this goal:  1.  I will answer the phone when FRW calls to assist me in adding my  to health insurance.  2.  I will answer the phone when CHW calls at outreach.      Goal update: 2022.           2. Correct name on insurance card (pt-stated)   20%  10%    Added 22 by Audelia Fields LSW      Goal statement: I would like assistance correcting the spelling of my name on my [employer] card within the next  3 months.    Date goal set: 22  Barriers: Language barrier and lack of communicty resources  Strengths: Accepting of support  Date to achieve by: 22  Patient expressed understanding of goal: Yes    Action steps to achieve this goal:  1.  I will answer the phone when FRW contacts me to assist me in correcting the spelling of my name with [employer].  2.  I will bring my new insurance card to [place] once I receive it.  3.  I will answer the phone when CHW calls at outreach.      Goal update: 2022.          Intervention and Education during outreach:   -Patient needs help to add new born for health insurance and to correct name on new health insurance card.  -CHW conducted FRW screening today and submitted referral to FRW to assist  patient.  -Patient's spouse is not returning to work yet at this time and family is receiving SNAP and other County benefits.  -Patient receiving new born birth certificate and WIC.  -No additional coordination assistance needed at this time.  -CHW informed patient to call with questions or concerns.       CHW Next Outreach: In one month.      Financial Resource Worker Screening    [facility] Benefits  Is patient requesting help applying for Critical access hospital benefits?: No  Have you recently applied for any Critical access hospital benefits?: No  How many people in your household?: 5  Do you buy/eat food together?: Yes  What is the monthly gross income for the household (wages, social security, workers comp, and pension)? : 0    [place]:  [place] No  Is this an insurance renewal?: No  Is this a new insurance application request?: Yes  Have you recently applied for insurance?: No  How many people in your household? : 5  Do you file taxes?: Yes  How many dependents do you claim?: 2  What is the monthly gross income for the household (wages, social security, workers comp, and pension)? : 0    Any other information for the FRW?: Patient's name spell incorrect on new health insurance card and needs help with adding new bor for health insurance resolve outstanding account balance.    Care Coordination team will tell patient:   Thank you for answering all the questions, based on screening questions, our Financial Resource Worker will reach out to you with additional questions and next steps.    New born's name Troy Verdugo, : [birth year]22.

## 2022-04-27 ENCOUNTER — PATIENT OUTREACH (OUTPATIENT)
Dept: CARE COORDINATION | Facility: CLINIC | Age: 37
End: 2022-04-27

## 2022-04-27 ENCOUNTER — OFFICE VISIT (OUTPATIENT)
Dept: FAMILY MEDICINE | Facility: CLINIC | Age: 37
End: 2022-04-27
Payer: COMMERCIAL

## 2022-04-27 VITALS
WEIGHT: 131.75 LBS | RESPIRATION RATE: 16 BRPM | SYSTOLIC BLOOD PRESSURE: 120 MMHG | DIASTOLIC BLOOD PRESSURE: 72 MMHG | HEART RATE: 63 BPM | OXYGEN SATURATION: 98 % | HEIGHT: 61 IN | TEMPERATURE: 97.9 F | BODY MASS INDEX: 24.87 KG/M2

## 2022-04-27 DIAGNOSIS — Z34.80 PRENATAL CARE, SUBSEQUENT PREGNANCY, UNSPECIFIED TRIMESTER: ICD-10-CM

## 2022-04-27 DIAGNOSIS — O34.219 VBAC, DELIVERED: ICD-10-CM

## 2022-04-27 PROBLEM — O21.9 NAUSEA AND VOMITING DURING PREGNANCY: Status: RESOLVED | Noted: 2021-11-04 | Resolved: 2022-04-27

## 2022-04-27 PROCEDURE — 99024 POSTOP FOLLOW-UP VISIT: CPT | Performed by: FAMILY MEDICINE

## 2022-04-27 RX ORDER — ACETAMINOPHEN AND CODEINE PHOSPHATE 120; 12 MG/5ML; MG/5ML
0.35 SOLUTION ORAL DAILY
Qty: 84 TABLET | Refills: 4 | Status: SHIPPED | OUTPATIENT
Start: 2022-06-27 | End: 2023-05-04

## 2022-04-27 RX ORDER — AMOXICILLIN 250 MG
1 CAPSULE ORAL DAILY PRN
Qty: 50 TABLET | Refills: 1 | Status: SHIPPED | OUTPATIENT
Start: 2022-04-27 | End: 2022-06-08

## 2022-04-27 RX ORDER — PNV NO.95/FERROUS FUM/FOLIC AC 28MG-0.8MG
1 TABLET ORAL DAILY
Qty: 90 TABLET | Refills: 3 | Status: SHIPPED | OUTPATIENT
Start: 2022-04-27 | End: 2023-05-04

## 2022-04-27 NOTE — TELEPHONE ENCOUNTER
Clinic Care Coordination Contact  Program: Adding /updating pts name with county for new insurance card  County:  [facility] Case #:  [facility] Worker:   Nicanor #:   Subscriber #:   Renewal:  Date Applied:     FRW Outreach:   22: FRW called pt on referral. Pt stated she has already applied for health insurance for  and has had assistance correcting her own insurance name. FRW told pt if she needs help in future the FRW can help.     Health Insurance:      Referral/Screening:  [facility] Benefits  Is patient requesting help applying for [facility] benefits?: No  Have you recently applied for any [facility] benefits?: No  How many people in your household?: 5  Do you buy/eat food together?: Yes  What is the monthly gross income for the household (wages, social security, workers comp, and pension)? : 0     [place]:  [place] No  Is this an insurance renewal?: No  Is this a new insurance application request?: Yes  Have you recently applied for insurance?: No  How many people in your household? : 5  Do you file taxes?: Yes  How many dependents do you claim?: 2  What is the monthly gross income for the household (wages, social security, workers comp, and pension)? : 0     Any other information for the FRW?: Patient's name spell incorrect on new health insurance card and needs help with adding new bor for health insurance resolve outstanding account balance.     Care Coordination team will tell patient:   Thank you for answering all the questions, based on screening questions, our Financial Resource Worker will reach out to you with additional questions and next steps.     New born's name Troy Verdugo, : 2020.       Goals Addressed:

## 2022-04-27 NOTE — PROGRESS NOTES
"SUBJECTIVE  Alexander Verdugo is a 37 year old female here for:    Chief Complaint   Patient presents with     Postpartum Care     Postpartum -2 week visit  Precipitous delivery 22-   Uncomplicated- had acute blood loss anemia 11-->9.5 post-delivery  Breastfeeding- going well.  Has not started micronor yet but plans to start and has prescription   Denies any mood concerns  Denies constipation, urinary concerns    ROS  See HPI    Reviewed Past Medical History, Medications, Family History and Social History in Epic and up to date with no new changes.    OBJECTIVE  /72   Pulse 63   Temp 97.9  F (36.6  C) (Oral)   Resp 16   Ht 1.543 m (5' 0.75\")   Wt 59.8 kg (131 lb 12 oz)   LMP 2021 (Approximate)   SpO2 98%   BMI 25.10 kg/m       General: Cooperative, pleasant, in no acute distress  CV: RRR, normal S1/S2, no murmur, rubs, gallops  Resp: No respiratory distress. Clear to auscultation bilaterally. No wheezes, rales, rhonchi  Abd: Soft, non-tender      ASSESSMENT/PLAN:   Alexander was seen today for postpartum care.    Diagnoses and all orders for this visit:    Routine postpartum follow-up    , delivered  -     norethindrone (MICRONOR) 0.35 MG tablet; Take 1 tablet (0.35 mg) by mouth daily  -     senna-docusate (SENOKOT-S/PERICOLACE) 8.6-50 MG tablet; Take 1 tablet by mouth daily as needed for constipation    Patient is a currently breast-feeding mother: Breastfeeding going well.    Prenatal care, subsequent pregnancy, unspecified trimester  -     Prenatal Vit-Fe Fumarate-FA (PRENATAL VITAMIN) 27-0.8 MG TABS; Take 1 tablet by mouth daily      Follow-up in 4 weeks for 6 week PP visit    Options for treatment and follow-up care were reviewed with the patient. Alexander Thoo and/or guardian was engaged and actively involved in the decision making process. Mu Thoo and/or guardian verbalized understanding of the options discussed and was satisfied with the final plan.      Marisa Collier MD    "

## 2022-05-26 ENCOUNTER — PATIENT OUTREACH (OUTPATIENT)
Dept: CARE COORDINATION | Facility: CLINIC | Age: 37
End: 2022-05-26

## 2022-05-26 ENCOUNTER — PATIENT OUTREACH (OUTPATIENT)
Dept: NURSING | Facility: CLINIC | Age: 37
End: 2022-05-26
Payer: COMMERCIAL

## 2022-05-26 NOTE — PROGRESS NOTES
Clinic Care Coordination Contact  Program: [place]   County:[facility] Case #:  [facility] Worker:   Nicanor #:   Subscriber #:   Renewal:  Date Applied:     FRW Outreach:   2022: [place] she should be getting something in the main with in the next couple weeks     Health Insurance:  Baby subscriber #[id number]  This subscriber has eligibility for MA: Medical Assistance.  Elig Type 11: Automatic  eligibility  Eligibility Begin Date: 2022  Eligibility End Date: --/--/----  This subscriber is eligible for the following service types: Medical Care ,  Chiropractic ,  Dental Care ,  Hospital ,  Hospital - Inpatient ,  Hospital - Outpatient ,  Emergency Services ,  Pharmacy ,  Professional (Physician) Visit - Office ,  Vision (Optometry) ,  Mental Health ,  Urgent Care  Referral/Screening:  New born's name Troy Verdugo, : 2022.  FRW Screening    Row Name 22 1127         [facility] Benefits     Is patient requesting help applying for [facility] benefits? No         Have you recently applied for any [facility] benefits? No         How many people in your household? 5         Do you buy/eat food together? Yes         What is the monthly gross income for the household (wages, social security, workers comp, and pension)?  0                   Insurance:     Is this an insurance renewal? No         Is this a new insurance application request? Yes         Have you recently applied for insurance? No         How many people in your household?  5         Do you file taxes? Yes         How many dependents do you claim? 2         What is the monthly gross income for the household (wages, social security, workers comp, and pension)?  0                   OTHER     Is this a [facility] care application? No               Goals Addressed:

## 2022-05-26 NOTE — PROGRESS NOTES
Clinic Care Coordination Contact  Community Health Worker Follow Up    Care Gaps:   There are no preventive care reminders to display for this patient.    Goals:    Goals Addressed as of 2022 at 11:29 AM                    Today    22       1. Add  to health insurance (pt-stated)   30%  20%    Added 3/16/22 by Jaya Rao      Goal statement: I would like assistance adding my  to health insurance and reprocessing any outstanding account balance within the next 3 to 4 months.    Date goal set: 3/16/22  Barriers: Language barrier and lack of communicty resources  Strengths: Accepting of support  Date to achieve by: 22  Patient expressed understanding of goal: Yes    Action steps to achieve this goal:  1.  I will answer the phone when FRW calls to assist me in adding my  to health insurance.  2.  I will provide all necessary documents when needed.  3.  I will communicate with CHW at outreach.       Goal update: 2022; 2022          Intervention and Education during outreach:  -Patient didn't receive anything in the mail regarding new born health insurance.   -Patient would like assistance adding new born for health insurance.  -Patient is receiving SNAP for $700 pre month and  recently resume working.  -Patient received new born's Social Security Card, WIC and birth certificate.   -Per patient requested, CHW completed FRW screening and referred to FRW to assist with adding new born for health insurance.       CHW Next Outreach: In one month.     Financial Resource Worker Screening    County Benefits  Is patient requesting help applying for county benefits?: No  Have you recently applied for any county benefits?: No  How many people in your household?: 5  Do you buy/eat food together?: Yes  What is the monthly gross income for the household (wages, social security, workers comp, and pension)? : 0    Insurance:  Is this an insurance renewal?: No  Is this a new insurance  application request?: Yes  Have you recently applied for insurance?: No  How many people in your household? : 5  Do you file taxes?: Yes  How many dependents do you claim?: 2  What is the monthly gross income for the household (wages, social security, workers comp, and pension)? : 0      Care Coordination team will tell patient:   Thank you for answering all the questions, based on screening questions, our Financial Resource Worker will reach out to you with additional questions and next steps.    New born's name: Troy Lucina and : [birth year]22.

## 2022-06-08 ENCOUNTER — PRENATAL OFFICE VISIT (OUTPATIENT)
Dept: FAMILY MEDICINE | Facility: CLINIC | Age: 37
End: 2022-06-08
Payer: COMMERCIAL

## 2022-06-08 VITALS
SYSTOLIC BLOOD PRESSURE: 120 MMHG | HEIGHT: 61 IN | RESPIRATION RATE: 16 BRPM | TEMPERATURE: 98 F | DIASTOLIC BLOOD PRESSURE: 80 MMHG | HEART RATE: 71 BPM | WEIGHT: 129 LBS | BODY MASS INDEX: 24.35 KG/M2 | OXYGEN SATURATION: 98 %

## 2022-06-08 DIAGNOSIS — D50.9 IRON DEFICIENCY ANEMIA, UNSPECIFIED IRON DEFICIENCY ANEMIA TYPE: ICD-10-CM

## 2022-06-08 PROCEDURE — 85018 HEMOGLOBIN: CPT | Performed by: FAMILY MEDICINE

## 2022-06-08 PROCEDURE — 99207 PR POST PARTUM EXAM: CPT | Performed by: FAMILY MEDICINE

## 2022-06-08 PROCEDURE — 36415 COLL VENOUS BLD VENIPUNCTURE: CPT | Performed by: FAMILY MEDICINE

## 2022-06-08 NOTE — PROGRESS NOTES
Assessment/Plan:     Alexander was seen today for post partum exam.    Diagnoses and all orders for this visit:    Routine postpartum follow-up    Patient is a currently breast-feeding mother    Iron deficiency anemia, unspecified iron deficiency anemia type: Recheck hemoglobin today to see if she can discontinue her oral iron. Continue PNV while breastfeeding.  -     Hemoglobin; Future        Anemia:  Hemoglobin checked today  Breastfeeding/Bottle feeding:  Pt is breasfeeding, discussed how to support  Contraception:   Mini-pill- she is taking this  Depression:   See [facility] Depresion survey  Exercise:   Encouraged increasing exercise based on how she is feeling.  Healthcare maintenance:   Up to date  Immunizations:    Immunizations up to date           Subjective:      Alexander Verdugo is a 37 year old female who presents for a postpartum visit.   She is postpartum following a .  I have fully reviewed the prenatal and intrapartum course.   Delivery notes below  Postpartum course has been Unremarkable.  Baby's course has been Uncomplicated.  Periods: no bleeding   Patient's last menstrual period was 2021 (approximate).   Bowel or bladder concerns: denies  Patient has not resumed intercourse.    [place]  Depression Scale: see flowsheet    Has questions about medical bills- she brings these in today. One for herself for the ambulance ride to labor and delivery. And a few from clinic for her baby, Troy Verdugo.     Last hgb on file:    Lab Results   Component Value Date    HGB 9.5 (L) 2022      The following portions of the patient's history were reviewed and updated as appropriate: allergies, current medications and problem list     OB History    Para Term  AB Living   5 3 3 0 2 3   SAB IAB Ectopic Multiple Live Births   2 0 0 0 3      # Outcome Date GA Lbr Norman/2nd Weight Sex Delivery Anes PTL Lv   5 Term 22 40w3d 03:20 / 00:14 3.78 kg (8 lb 5.3 oz) M  None N HAI      Name:  "ALEXANDRA,MALE-MU      Apgar1: 8  Apgar5: 9   4 SAB 10/18/17     AB, MISSED         Birth Comments: Missed AB s/p D&C   3 Term 06/23/15 39w0d  3.742 kg (8 lb 4 oz) M CS-LTranv EPI  HAI      Birth Comments: [place]      Complications: Fetal Intolerance   2 SAB 14              Birth Comments: Blighted ovum s/p D&C   1 Term 2010    M Vag-Spont   HAI      Birth Comments: [place]      Obstetric Comments   1st living child born in [facility]   2nd living child born in [place]     Information for the patient's :  Alexandra Troy [[id number]]   Troy Drewfaiza       Objective:      /80   Pulse 71   Temp 98  F (36.7  C) (Oral)   Resp 16   Ht 1.543 m (5' 0.75\")   Wt 58.5 kg (129 lb)   LMP 2021 (Approximate)   SpO2 98%   Breastfeeding Yes   BMI 24.58 kg/m    Wt Readings from Last 3 Encounters:   22 58.5 kg (129 lb)   22 59.8 kg (131 lb 12 oz)   22 66.7 kg (147 lb)     Physical Exam:  General Appearance: Alert, cooperative, no distress, appears stated age  Heart: Regular rate and rhythm, S1 and S2 normal, no murmur, rub, or gallop  Abdomen: Soft, non-tender, no masses, no organomegaly  Pelvic: Normally developed genitalia, well healed perineum    Marisa Collier MD           "

## 2022-06-09 LAB — HGB BLD-MCNC: 11.7 G/DL (ref 11.7–15.7)

## 2022-06-15 ENCOUNTER — PATIENT OUTREACH (OUTPATIENT)
Dept: CARE COORDINATION | Facility: CLINIC | Age: 37
End: 2022-06-15
Payer: COMMERCIAL

## 2022-06-15 NOTE — PROGRESS NOTES
Care Coordination Clinician Chart Review     Situation: Patient chart reviewed by care coordinator.?     Background: Initial assessment and enrollment to Care Coordination was 12/3/2021.?? Patient centered goals were developed with participation from patient.? Lead CC handed patient off to CHW for continued outreach every 30 days.??     Assessment: Per chart review, patient outreach completed by CC CHW on 2022.? Patient is actively working to accomplish goal(s).? Patient's goal(s) remain(s) appropriate at this time.? Patient is due for updated Plan of Care.? Annual assessment will be due 12/3/2022. Per chart review, patient's new born still has outstanding bill of $1,322.87. MRN: [id number]. Episode was resolved on 22 so re-activated the episode today. Per chart review, patient continues to work with FRW to resolved outstanding bill with [facility] and add new born under mom's insurance.        Goals        1. Add  to health insurance (pt-stated)       Goal statement: I would like assistance adding my  to health insurance and reprocessing any outstanding account balance within the next 3 to 4 months.    Date goal set: 3/16/22  Barriers: Language barrier and lack of communicty resources  Strengths: Accepting of support  Date to achieve by: 22  Patient expressed understanding of goal: Yes    Action steps to achieve this goal:  1.  I will answer the phone when FRW calls to assist me in adding my  to health insurance.  2.  I will provide all necessary documents when needed.  3.  I will communicate with CHW at outreach.       Goal update: 2022; 2022          ??     Plan/Recommendations: The patient will continue working with Care Coordination to achieve above goal(s).? CHW will involve Lead CC as needed or if patient is ready to move to maintenance.? Lead CC will continue to monitor CHW s monthly outreaches and progress to goal(s) every 6 weeks.?     Plan of Care updated and sent to  patient: No

## 2022-06-28 ENCOUNTER — PATIENT OUTREACH (OUTPATIENT)
Dept: CARE COORDINATION | Facility: CLINIC | Age: 37
End: 2022-06-28

## 2022-06-28 NOTE — PROGRESS NOTES
Clinic Care Coordination Contact    Follow Up Progress Note      Care Gaps:  There are no preventive care reminders to display for this patient.    Goals addressed this encounter:    Goals Addressed                    This Visit's Progress       COMPLETED: 1. Add  to health insurance (pt-stated)   100%      Goal statement: I would like assistance adding my  to health insurance and reprocessing any outstanding account balance within the next 3 to 4 months.    Date goal set: 3/16/22  Barriers: Language barrier and lack of communicty resources  Strengths: Accepting of support  Date to achieve by: 22  Patient expressed understanding of goal: Yes    Action steps to achieve this goal:  1.  I received my new born's health insurance and it's active.  2.  I will contact CHW for additional resources or if coordination assistance needed.      Goal completed: 2022.

## 2022-06-29 ENCOUNTER — PATIENT OUTREACH (OUTPATIENT)
Dept: CARE COORDINATION | Facility: CLINIC | Age: 37
End: 2022-06-29

## 2022-06-29 NOTE — PROGRESS NOTES
Care Coordination Clinician Chart Review     Situation: Patient chart reviewed by care coordinator.?     Background: Initial assessment and enrollment to Care Coordination was 12/3/2021.?? Patient centered goals were developed with participation from patient.? Lead CC handed patient off to CHW for continued outreach every 30 days.??     Assessment: Per chart review, patient outreach completed by CC CHW on 6/28/2022.? Patient is actively working to accomplish goal(s).? Patient's goal(s) remain(s) appropriate at this time.? Patient is not due for updated Plan of Care.? Annual assessment will be due 12/3/2022. Per CHW, patient completed all goals as of 6/28/2022. Patient was working on adding on new born insurance and now her new born has active insurance. No further assist needed from [facility].     Plan/Recommendations: Patient has been graduated from [facility].

## 2022-06-29 NOTE — LETTER
[facility] CARE COORDINATION    2022    Alexander Thoo  [address]  [place] [address]    Dear Alexander,  Your Care Team congratulates you on your journey to maintain wellness. This document will help guide you on your journey to maintain a healthy lifestyle.  You can use this to help you overcome any barriers you may encounter.  If you should have any questions or concerns, you can contact the members of your Care Team or contact your Primary Care Clinic for assistance.     Health Maintenance  Health Maintenance Reviewed:      My Access Plan  Medical Emergency 911   Primary Clinic Line [facility] - [phone]   24 Hour Appointment Line [phone] or  [phone]) (toll-free)   24 Hour Nurse Line [phone] (toll-free)   Preferred Urgent Care     Preferred Hospital     Preferred Pharmacy [facility] - [place], [place] - [address] [place] [address] Pkwy     Behavioral Health Crisis Line The National Suicide Prevention Lifeline at 1-547.383.9054 or 911     My Care Team Members  Patient Care Team       Relationship Specialty Notifications Start End    Marisa Collier MD PCP - General Family Medicine  22     Phone: [phone] Fax: [phone]         [address] [place] [address]    Marisa Collier MD Assigned PCP   21     Phone: [phone] Fax: [phone]         [address] [place] [address]               Goals        COMPLETED: 1. Add  to health insurance (pt-stated)       Goal statement: I would like assistance adding my  to health insurance and reprocessing any outstanding account balance within the next 3 to 4 months.    Date goal set: 3/16/22  Barriers: Language barrier and lack of communicty resources  Strengths: Accepting of support  Date to achieve by: 22  Patient expressed understanding of goal: Yes    Action steps to achieve this goal:  1.  I received my new born's health insurance and it's  active.  2.  I will contact CHW for additional resources or if coordination assistance needed.      Goal completed: 6/28/2022.           COMPLETED: 1. Follow up with  (pt-stated)       Goal statement: I will follow up with [facility] DIONIS[facility] for assistance with my household report forms within the next 14 days.     Date goal set: 12/3/21  Barriers: Language barrier  Strengths: Accepting of support  Date to achieve by: 2/1/22  Patient expressed understanding of goal: Yes     Action steps to achieve this goal:  1.  I attended my f/u appointment with DIONISIO Win on 12/10/21 at 1pm.    NOTE: [employer] transportation phone # [phone] set up by Tory Dowell as of 12/3/21.    Completed: 12/10/21           COMPLETED: 1. FRW (pt-stated)       Goal statement: I need assistance contacting [facility] to resume my MFIP and SNAP benefits within the next 30 days.     Date goal set: 12/30/21  Barriers: Language barrier, food insecurity  Strengths: Accepting of support  Date to achieve by: 1/30/22  Patient expressed understanding of goal: Yes    Action steps to achieve this goal:  1.  I will continue to work with FRW regarding regarding [facility] benefits.  2.  I will provide accurate information about my income, household size, etc. to ensure that my eligibility for benefits is determined appropriately.     NOTE: [facility] Case #[id number], Worker: Edy MCCLAIN, Ph: [phone]    Goal update: 1/06/2022           COMPLETED: 2. WIC (pt-stated)       Goal statement: I would like to apply for benefits through the [facility] WIC program within the next 30 days.     Date goal set: 12/3/21  Barriers: Language barrier, food insecurity  Strengths: Accepting of support  Date to achieve by: 2/15/22  Patient expressed understanding of goal: Yes     Action steps to achieve this goal:  1.  I will received WIC and able to get foods with [place] or card.  2.  I will communicate with CHW at outreach and request assistance arranging medical rides  as needed.      Goal completed: 1-6/2022           COMPLETED: 3. Carseat (pt-stated)       Goal statement: I would like to obtain a carseat through [employer] prior to my SALLIE of 4/9/22.    Date goal set: 12/6/21  Barriers: Language barrier, food insecurity  Strengths: Accepting of support  Date to achieve by: 4/9/22  Patient expressed understanding of goal: Yes    Action steps to achievethis goal:  1.  I will answer the phone when [employer] contacts me 4-6 weeks prior to my SALLIE (4/9/22) to arrange carseat delivery. (Careseat received).   2.  I will communicate with CHW at outreach.    NOTE: Carseat referral placed by CHW Mishel Meredith on 11/4/21 and pending.     Goal completed: 3/16/2022.                   It has been your Clinic Care Team's pleasure to work with you on your goals.    Regards,  Your Clinic Care Team

## 2023-05-03 LAB
ABO/RH(D): NORMAL
ANTIBODY SCREEN: NEGATIVE
SPECIMEN EXPIRATION DATE: NORMAL

## 2023-05-04 ENCOUNTER — OFFICE VISIT (OUTPATIENT)
Dept: FAMILY MEDICINE | Facility: CLINIC | Age: 38
End: 2023-05-04
Payer: COMMERCIAL

## 2023-05-04 VITALS
HEIGHT: 60 IN | HEART RATE: 78 BPM | TEMPERATURE: 97.6 F | SYSTOLIC BLOOD PRESSURE: 109 MMHG | OXYGEN SATURATION: 99 % | WEIGHT: 127 LBS | DIASTOLIC BLOOD PRESSURE: 75 MMHG | BODY MASS INDEX: 24.94 KG/M2

## 2023-05-04 DIAGNOSIS — R11.0 NAUSEA: ICD-10-CM

## 2023-05-04 DIAGNOSIS — N92.6 MISSED MENSES: ICD-10-CM

## 2023-05-04 DIAGNOSIS — Z32.01 PREGNANCY TEST POSITIVE: Primary | ICD-10-CM

## 2023-05-04 PROBLEM — Z28.39 INCOMPLETE IMMUNIZATION STATUS: Status: ACTIVE | Noted: 2020-12-30

## 2023-05-04 LAB
ERYTHROCYTE [DISTWIDTH] IN BLOOD BY AUTOMATED COUNT: 13.2 % (ref 10–15)
HCG UR QL: POSITIVE
HCT VFR BLD AUTO: 38.6 % (ref 35–47)
HGB BLD-MCNC: 12.6 G/DL (ref 11.7–15.7)
MCH RBC QN AUTO: 28.3 PG (ref 26.5–33)
MCHC RBC AUTO-ENTMCNC: 32.6 G/DL (ref 31.5–36.5)
MCV RBC AUTO: 87 FL (ref 78–100)
PLATELET # BLD AUTO: 250 10E3/UL (ref 150–450)
RBC # BLD AUTO: 4.46 10E6/UL (ref 3.8–5.2)
WBC # BLD AUTO: 6.5 10E3/UL (ref 4–11)

## 2023-05-04 PROCEDURE — 86780 TREPONEMA PALLIDUM: CPT | Performed by: FAMILY MEDICINE

## 2023-05-04 PROCEDURE — 87086 URINE CULTURE/COLONY COUNT: CPT | Performed by: FAMILY MEDICINE

## 2023-05-04 PROCEDURE — 86900 BLOOD TYPING SEROLOGIC ABO: CPT | Performed by: FAMILY MEDICINE

## 2023-05-04 PROCEDURE — 86762 RUBELLA ANTIBODY: CPT | Performed by: FAMILY MEDICINE

## 2023-05-04 PROCEDURE — 86850 RBC ANTIBODY SCREEN: CPT | Performed by: FAMILY MEDICINE

## 2023-05-04 PROCEDURE — 87389 HIV-1 AG W/HIV-1&-2 AB AG IA: CPT | Performed by: FAMILY MEDICINE

## 2023-05-04 PROCEDURE — 87340 HEPATITIS B SURFACE AG IA: CPT | Performed by: FAMILY MEDICINE

## 2023-05-04 PROCEDURE — 99214 OFFICE O/P EST MOD 30 MIN: CPT | Performed by: FAMILY MEDICINE

## 2023-05-04 PROCEDURE — 81025 URINE PREGNANCY TEST: CPT | Performed by: FAMILY MEDICINE

## 2023-05-04 PROCEDURE — 86901 BLOOD TYPING SEROLOGIC RH(D): CPT | Performed by: FAMILY MEDICINE

## 2023-05-04 PROCEDURE — 83655 ASSAY OF LEAD: CPT | Mod: 90 | Performed by: FAMILY MEDICINE

## 2023-05-04 PROCEDURE — 36415 COLL VENOUS BLD VENIPUNCTURE: CPT | Performed by: FAMILY MEDICINE

## 2023-05-04 PROCEDURE — 99000 SPECIMEN HANDLING OFFICE-LAB: CPT | Performed by: FAMILY MEDICINE

## 2023-05-04 PROCEDURE — 85027 COMPLETE CBC AUTOMATED: CPT | Performed by: FAMILY MEDICINE

## 2023-05-04 RX ORDER — ONDANSETRON 4 MG/1
4 TABLET, ORALLY DISINTEGRATING ORAL EVERY 8 HOURS PRN
Qty: 30 TABLET | Refills: 3 | Status: SHIPPED | OUTPATIENT
Start: 2023-05-04 | End: 2023-05-22

## 2023-05-04 RX ORDER — PRENATAL VIT/IRON FUM/FOLIC AC 27MG-0.8MG
1 TABLET ORAL DAILY
Qty: 90 TABLET | Refills: 3 | Status: SHIPPED | OUTPATIENT
Start: 2023-05-04 | End: 2023-05-22

## 2023-05-04 NOTE — Clinical Note
Hello! You just delivered her son 9 months ago by , pregnant again. Had 3 different charts and they should merge. Pregnancy history and chart reconciled and all the duplications should be removed! US ordered, labs done today, seeing you in a few weeks.

## 2023-05-04 NOTE — PROGRESS NOTES
Assessment & Plan     Missed menses  Pregnancy test positive  - HCG qualitative urine    Pregnancy test positive  This is a now  here at 9w2d by sure LMP 23, just had a baby last year by . Will get dating US, do labs today, schedule for IOB. Start prenatal, ok for zofran for nausea. Patient had 3 charts, hopefully they get merged. First child , second was low transverse , third .    - US OB < 14 Weeks Single  - Prenatal Vit-Fe Fumarate-FA (PRENATAL MULTIVITAMIN W/IRON) 27-0.8 MG tablet  Dispense: 90 tablet; Refill: 3  - ABO/Rh type and screen  - Hepatitis B surface antigen  - CBC with platelets  - HIV Antigen Antibody Combo  - Rubella Antibody IgG  - Treponema Abs w Reflex to RPR and Titer  - Urine Culture Aerobic Bacterial  - Lead, Venous Blood  - Chlamydia trachomatis PCR  - Neisseria gonorrhoeae PCR  - Urine Culture Aerobic Bacterial  - ABO/Rh type and screen  - Hepatitis B surface antigen  - CBC with platelets  - HIV Antigen Antibody Combo  - Rubella Antibody IgG  - Treponema Abs w Reflex to RPR and Titer  - Lead, Venous Blood    Nausea  - ondansetron (ZOFRAN ODT) 4 MG ODT tab  Dispense: 30 tablet; Refill: 3      Return in about 2 weeks (around 2023).  3 charts merged, OB history and entire chart reconciled.     30 minutes spent on the date of the encounter doing chart review, history and exam, documentation and further activities per the note        Mary River MD  [facility] is a 38 year old, presenting for the following health issues:  Confirmation Of Pregnancy      History of Present Illness       Reason for visit:  PREGNANCY CONFIRMATION    She eats 0-1 servings of fruits and vegetables daily.She consumes 0 sweetened beverage(s) daily.She exercises with enough effort to increase her heart rate 9 or less minutes per day.  She exercises with enough effort to increase her heart rate 3 or less days per week.        This is a now  ", her LMP was 2023. 9w2d with EDC 2023.   She wasn't expecting to get pregnant so quickly, her youngest son is 9 months old. She was thinking about doing depo after her period came back at 6 months but didn't. Some nausea with vomiting, able to eat but very little. Is happy about the pregnancy but wasn't planning on getting pregnant this soon after her son.     Wt Readings from Last 4 Encounters:   23 57.6 kg (127 lb)   22 58.5 kg (129 lb)   22 59.8 kg (131 lb 12 oz)   22 66.7 kg (147 lb)         Review of Systems   Constitutional, HEENT, cardiovascular, pulmonary, gi and gu systems are negative, except as otherwise noted.      Objective    /75 (BP Location: Right arm, Patient Position: Sitting, Cuff Size: Adult Regular)   Pulse 78   Temp 97.6  F (36.4  C) (Oral)   Ht 1.53 m (5' 0.25\")   Wt 57.6 kg (127 lb)   LMP 2023   SpO2 99%   BMI 24.60 kg/m    Body mass index is 24.6 kg/m .  Physical Exam   GENERAL: healthy, alert and no distress  NECK: no adenopathy, no asymmetry, masses, or scars and thyroid normal to palpation  RESP: lungs clear to auscultation - no rales, rhonchi or wheezes  CV: regular rate and rhythm, normal S1 S2, no S3 or S4, no murmur, click or rub, no peripheral edema and peripheral pulses strong  ABDOMEN: soft, nontender, no hepatosplenomegaly, no masses and bowel sounds normal  MS: no gross musculoskeletal defects noted, no edema    Results for orders placed or performed in visit on 23 (from the past 24 hour(s))   HCG qualitative urine   Result Value Ref Range    hCG Urine Qualitative Positive (A) Negative   ABO/Rh type and screen    Narrative    The following orders were created for panel order ABO/Rh type and screen.  Procedure                               Abnormality         Status                     ---------                               -----------         ------                     Adult Type and Screen[[id number]]             "                In process                   Please view results for these tests on the individual orders.   CBC with platelets   Result Value Ref Range    WBC Count 6.5 4.0 - 11.0 10e3/uL    RBC Count 4.46 3.80 - 5.20 10e6/uL    Hemoglobin 12.6 11.7 - 15.7 g/dL    Hematocrit 38.6 35.0 - 47.0 %    MCV 87 78 - 100 fL    MCH 28.3 26.5 - 33.0 pg    MCHC 32.6 31.5 - 36.5 g/dL    RDW 13.2 10.0 - 15.0 %    Platelet Count 250 150 - 450 10e3/uL

## 2023-05-05 LAB
HBV SURFACE AG SERPL QL IA: NONREACTIVE
HIV 1+2 AB+HIV1 P24 AG SERPL QL IA: NONREACTIVE
LEAD BLDV-MCNC: <2 UG/DL
RUBV IGG SERPL QL IA: 1.44 INDEX
RUBV IGG SERPL QL IA: POSITIVE
T PALLIDUM AB SER QL: NONREACTIVE

## 2023-05-06 LAB — BACTERIA UR CULT: NORMAL

## 2023-05-10 ENCOUNTER — HOSPITAL ENCOUNTER (OUTPATIENT)
Dept: ULTRASOUND IMAGING | Facility: HOSPITAL | Age: 38
Discharge: HOME | End: 2023-05-10
Attending: FAMILY MEDICINE | Admitting: FAMILY MEDICINE
Payer: COMMERCIAL

## 2023-05-10 DIAGNOSIS — Z32.01 PREGNANCY TEST POSITIVE: ICD-10-CM

## 2023-05-10 PROCEDURE — 76801 OB US < 14 WKS SINGLE FETUS: CPT

## 2023-05-22 ENCOUNTER — PRENATAL OFFICE VISIT (OUTPATIENT)
Dept: FAMILY MEDICINE | Facility: CLINIC | Age: 38
End: 2023-05-22
Payer: COMMERCIAL

## 2023-05-22 VITALS
WEIGHT: 129.25 LBS | SYSTOLIC BLOOD PRESSURE: 112 MMHG | RESPIRATION RATE: 16 BRPM | OXYGEN SATURATION: 100 % | HEIGHT: 60 IN | DIASTOLIC BLOOD PRESSURE: 73 MMHG | TEMPERATURE: 98.3 F | BODY MASS INDEX: 25.38 KG/M2 | HEART RATE: 77 BPM

## 2023-05-22 DIAGNOSIS — Z34.90 PREGNANCY, UNSPECIFIED GESTATIONAL AGE: Primary | ICD-10-CM

## 2023-05-22 DIAGNOSIS — Z34.80 PRENATAL CARE, SUBSEQUENT PREGNANCY, UNSPECIFIED TRIMESTER: ICD-10-CM

## 2023-05-22 DIAGNOSIS — Z32.01 PREGNANCY TEST POSITIVE: ICD-10-CM

## 2023-05-22 DIAGNOSIS — Z98.891 HISTORY OF CESAREAN SECTION: ICD-10-CM

## 2023-05-22 DIAGNOSIS — O09.529 ANTEPARTUM MULTIGRAVIDA OF ADVANCED MATERNAL AGE: ICD-10-CM

## 2023-05-22 PROBLEM — D62 ANEMIA DUE TO BLOOD LOSS, ACUTE: Status: RESOLVED | Noted: 2022-04-13 | Resolved: 2023-05-22

## 2023-05-22 LAB
C TRACH DNA SPEC QL NAA+PROBE: NEGATIVE
HCV AB SERPL QL IA: NONREACTIVE
N GONORRHOEA DNA SPEC QL NAA+PROBE: NEGATIVE

## 2023-05-22 PROCEDURE — 36415 COLL VENOUS BLD VENIPUNCTURE: CPT | Performed by: FAMILY MEDICINE

## 2023-05-22 PROCEDURE — 87591 N.GONORRHOEAE DNA AMP PROB: CPT | Performed by: FAMILY MEDICINE

## 2023-05-22 PROCEDURE — 99214 OFFICE O/P EST MOD 30 MIN: CPT | Performed by: FAMILY MEDICINE

## 2023-05-22 PROCEDURE — 86803 HEPATITIS C AB TEST: CPT | Performed by: FAMILY MEDICINE

## 2023-05-22 PROCEDURE — 87491 CHLMYD TRACH DNA AMP PROBE: CPT | Performed by: FAMILY MEDICINE

## 2023-05-22 RX ORDER — PRENATAL VIT/IRON FUM/FOLIC AC 27MG-0.8MG
1 TABLET ORAL DAILY
Qty: 90 TABLET | Refills: 3 | Status: SHIPPED | OUTPATIENT
Start: 2023-05-22 | End: 2023-07-20

## 2023-05-22 NOTE — PROGRESS NOTES
New OB Clinic Note - 2023   Visit was completed along with Cynthia .   SUBJECTIVE:  Alexander Verdugo is a 38 year old  female @ 13w0d who is here for new OB visit.   LMP 2823. Ultrasound at 11w2d gave EDC of 23.  Current Concerns:    She reports her nausea has improved. Not taking zofran- was unable to pick this medication up  She was unable to  her PNV   She denies bleeding, cramping. No loss of fluid. She denies HA.   Denies dysuria or hematuria.   Denies constipation.  OB History:    OB History    Para Term  AB Living   6 3 3 0 2 3   SAB IAB Ectopic Multiple Live Births   2 0 0 0 3      # Outcome Date GA Lbr Normna/2nd Weight Sex Delivery Anes PTL Lv   6 Current            5 Term 22 40w3d 03:20 / 00:14 3.78 kg (8 lb 5.3 oz) M  None N HAI      Name: ALEXANDRA,MALE-MU      Apgar1: 8  Apgar5: 9   4 SAB 10/18/17     AB, MISSED         Birth Comments: Missed AB s/p D&C   3 Term 06/23/15 39w0d  3.742 kg (8 lb 4 oz) F CS-LTranv EPI  HAI      Birth Comments: [place]      Complications: Fetal Intolerance      Name: Luis Alberto Alvarez   2 SAB 14              Birth Comments: Blighted ovum s/p D&C   1 Term 12/25/10    M Vag-Spont   HAI      Name: Juan Ramon Menon      Obstetric Comments   1st living child born in [facility]   2nd living child born in [place]     She does not have a history of  birth before 37 weeks with a black gestation.  She does not have a history of preeclampsia in prior pregnancy.   She does not have a history of recurrent (>2) pregnancy losses.  She does not have a history of Down's syndrome, sickle cell anemia or other genetic disorder affecting a child in her family.  She does not have a history of major depression or postpartum depression.  She does not have a history of STI's including Chlamydia, gonorrhea, Hep B virus, syphilis, HPV, or genital herpes.   Social Hx:   She has not used tobacco, has not used EtOH and has not used illicit  "drugs.  Current Medications: prenatal vitamins    No past medical history on file.  Past Surgical History:   Procedure Laterality Date     C/SECTION, LOW TRANSVERSE N/A 2015    in [place]      SECTION  2015    [place] at [facility]     Family History   Problem Relation Age of Onset     Diabetes No family hx of      Hypertension No family hx of      Current Outpatient Medications   Medication     ondansetron (ZOFRAN ODT) 4 MG ODT tab     Prenatal Vit-Fe Fumarate-FA (PRENATAL MULTIVITAMIN W/IRON) 27-0.8 MG tablet     No current facility-administered medications for this visit.     ?  OBJECTIVE:  Vitals:    23 0854   BP: 112/73   Pulse: 77   Resp: 16   Temp: 98.3  F (36.8  C)   TempSrc: Oral   SpO2: 100%   Weight: 58.6 kg (129 lb 4 oz)   Height: 1.53 m (5' 0.25\")     Gen: Awake, alert, in no acute distress  Abd: non-tender, non-distended.   Pelvic Exam: Vagina and vulva are normal; no discharge is noted. She is NOT due for pap smear today  Lower extremities: No edema  Neuro: No focal deficits  's  No results found for any visits on 23.  ASSESSMENT/PLAN:  Alexander Verdugo is a 38 year old  at 13w0d weeks by 11 week US. Estimated Date of Delivery: 2023.   1. Discussed healthy eating habits, exercise in pregnancy  2. Started prenatal vitamins.   3. Pelvic exam including GC, Chlamydia and PAP (if >21yrs) was completed.  4. Prenatal labs completed - prenatal profile, UA/UC, HIV - reviewed results.  5. Reviewed eligibility for low-dose aspirin therapy for prevention of preeclampsia (preeclampsia in prior pregnancy, pre-existing HTN or DM, or multiple other risk factors including  delivery, chronic HTN, DM, obesity, low socioeconomic status, non- ethnicity). Patient is not a candidate for this.   6. Offered screening- NIPT completed today.  7. Counseled on benefits of breastfeeding. Patient is planning to breastfeed.     Alexander was seen today for prenatal " care.    Diagnoses and all orders for this visit:    Pregnancy, unspecified gestational age  -     Chlamydia trachomatis PCR; Future  -     Neisseria gonorrhoeae PCR; Future  -     Hepatitis C antibody; Future  -     Hepatitis C antibody  -     Chlamydia trachomatis PCR  -     Neisseria gonorrhoeae PCR    History of  section: Had successful - will need TOLAC consent later in pregnancy.    Prenatal care, subsequent pregnancy, unspecified trimester  -     Prenatal Vit-Fe Fumarate-FA (PRENATAL MULTIVITAMIN W/IRON) 27-0.8 MG tablet; Take 1 tablet by mouth daily    Antepartum multigravida of advanced maternal age: Reviewed cfDNA.  -     Invitae Non-Invasive Prenatal Screening; Future  -     Invitae Non-Invasive Prenatal Screening      ?  RTC in 4 weeks or sooner if problems. At next visit: offer MSAFP, schedule anatomy scan at 20 weeks gestation    Marisa Collier MD        Options for treatment and follow-up care were reviewed with the patient and/or guardian. Mu Thoo and/or guardian engaged in the decision making process and verbalized understanding of the options discussed and agreed with the final plan.

## 2023-05-30 LAB — SCANNED LAB RESULT: NORMAL

## 2023-06-12 NOTE — PROGRESS NOTES
"SUBJECTIVE:   at 34w6d. Estimated Date of Delivery: 2022.  She is doing well. She denies vaginal bleeding, leakage of fluid, or urinary symptoms. Fetal movement is present. She is not having contractions.  Concerns: No concerns.  ROS  Negative except as noted above in HPI  OBJECTIVE:  Blood pressure 96/58, pulse 84, temperature 98  F (36.7  C), temperature source Oral, resp. rate 16, height 1.543 m (5' 0.75\"), weight 65.2 kg (143 lb 12 oz), last menstrual period 2021, SpO2 98 %.  Gen: Comfortable, no acute distress.  Abd: Gravid, non-tender  See OB Vitals flowsheet.  ASSESSMENT/PLAN:  Alexander was seen today for prenatal care.    Diagnoses and all orders for this visit:    Transverse lie of fetus, single or unspecified fetus: Reviewed ECV vs repeat C/S- she desires ECV. Reviewed she will likely need consult appointment with [facility] prior to procedure at [facility]. Sent to  to assist with setting up appointment due to language barrier.  -     Ob/Gyn Referral; Future      -IUP at 34w6d:     Prenatal labs reviewed     RTC in 1 weeks or sooner with problems. GBS next visit    Visit completed along with assistance of Cynthia .    Marisa Collier MD    " No

## 2023-07-20 ENCOUNTER — PRENATAL OFFICE VISIT (OUTPATIENT)
Dept: FAMILY MEDICINE | Facility: CLINIC | Age: 38
End: 2023-07-20
Payer: COMMERCIAL

## 2023-07-20 ENCOUNTER — PATIENT OUTREACH (OUTPATIENT)
Dept: CARE COORDINATION | Facility: CLINIC | Age: 38
End: 2023-07-20

## 2023-07-20 ENCOUNTER — HOSPITAL ENCOUNTER (OUTPATIENT)
Dept: ULTRASOUND IMAGING | Facility: HOSPITAL | Age: 38
Discharge: HOME | End: 2023-07-20
Attending: FAMILY MEDICINE | Admitting: FAMILY MEDICINE
Payer: COMMERCIAL

## 2023-07-20 VITALS
BODY MASS INDEX: 26.42 KG/M2 | DIASTOLIC BLOOD PRESSURE: 64 MMHG | OXYGEN SATURATION: 98 % | HEIGHT: 60 IN | SYSTOLIC BLOOD PRESSURE: 102 MMHG | WEIGHT: 134.56 LBS | RESPIRATION RATE: 16 BRPM | TEMPERATURE: 98.2 F | HEART RATE: 79 BPM

## 2023-07-20 DIAGNOSIS — Z98.891 HISTORY OF CESAREAN SECTION: ICD-10-CM

## 2023-07-20 DIAGNOSIS — Z34.90 PREGNANCY, UNSPECIFIED GESTATIONAL AGE: Primary | ICD-10-CM

## 2023-07-20 DIAGNOSIS — Z32.01 PREGNANCY TEST POSITIVE: ICD-10-CM

## 2023-07-20 DIAGNOSIS — Z34.90 PREGNANCY, UNSPECIFIED GESTATIONAL AGE: ICD-10-CM

## 2023-07-20 PROBLEM — D50.9 ANEMIA, IRON DEFICIENCY: Status: RESOLVED | Noted: 2022-04-13 | Resolved: 2023-07-20

## 2023-07-20 PROBLEM — Z28.39 INCOMPLETE IMMUNIZATION STATUS: Status: RESOLVED | Noted: 2020-12-30 | Resolved: 2023-07-20

## 2023-07-20 PROCEDURE — 76805 OB US >/= 14 WKS SNGL FETUS: CPT

## 2023-07-20 PROCEDURE — 99207 PR PRENATAL VISIT: CPT | Performed by: FAMILY MEDICINE

## 2023-07-20 RX ORDER — PRENATAL VIT/IRON FUM/FOLIC AC 27MG-0.8MG
1 TABLET ORAL DAILY
Qty: 90 TABLET | Refills: 3 | Status: SHIPPED | OUTPATIENT
Start: 2023-07-20 | End: 2023-09-18

## 2023-07-20 NOTE — PROGRESS NOTES
Clinic Care Coordination Contact  Community Health Worker Initial Outreach    CHW Initial Information Gathering:  Referral Source: PCP  Preferred Hospital: [facility]  [phone]  Preferred Urgent Care: [facility] [place], [phone]  Current living arrangement:: I live in a private home with family  Type of residence:: Apartment  Community Resources: None  Supplies Currently Used at Home: None  Equipment Currently Used at Home: none  Informal Support system:: Family  No PCP office visit in Past Year: No  Transportation means:: Medical transport  CHW Additional Questions  If ED/Hospital discharge, follow-up appointment scheduled as recommended?: N/A  Medication changes made following ED/Hospital discharge?: N/A  MyChart active?: No  Patient agreeable to assistance with activating MyChart?: No    Patient accepts CC: Yes. Patient scheduled for assessment with CCC RN on 7/27/2023 at 10:00 AM. Patient noted desire to discuss Pregnant. Has bill from 4/14/22 (home visit postpartum?) from [facility] for $90, does she need to pay..     Patient is establish with WIC clinic and has car seat from previous new born which is about a year ago and still in good condition, per patient.

## 2023-07-20 NOTE — PROGRESS NOTES
"SUBJECTIVE:   at 21w3d. Estimated Date of Delivery: 2023.  She is doing well. She denies nausea and vomiting. She denies abdominal pain/cramping, vaginal bleeding, leakage of fluid. Fetal movement is present.   Concerns: she now has active insurance and would like refill of her prenatal vitamins  ROS  Negative except as noted above in HPI.  OBJECTIVE:  Blood pressure 102/64, pulse 79, temperature 98.2  F (36.8  C), temperature source Oral, resp. rate 16, height 1.53 m (5' 0.25\"), weight 61 kg (134 lb 9 oz), last menstrual period 2023, SpO2 98 %, currently breastfeeding.  Gen: comfortable, no acute distress.  See OB Vitals flowsheet.  ASSESSMENT/PLAN:  Alexander was seen today for prenatal care.    Diagnoses and all orders for this visit:    Pregnancy, unspecified gestational age:   -     US OB > 14 Weeks; Future  -     Primary Care - Care Coordination Referral; Future    History of  section: Will need TOLAC consent later in pregnancy.    Refilled medication  -     Prenatal Vit-Fe Fumarate-FA (PRENATAL MULTIVITAMIN W/IRON) 27-0.8 MG tablet; Take 1 tablet by mouth daily      -IUP at 21w3d:     Prenatal labs reviewed     Set up ultrasound during clinic appointment today- she will use transportation from a friend for this appointment    Offered  support. Patient will consider this but is leaning towards having her  only.     RTC in 4 weeks or sooner with problems.     Visit completed along with assistance of Cynthia .    Marisa Collier MD    "

## 2023-07-28 ENCOUNTER — PATIENT OUTREACH (OUTPATIENT)
Dept: NURSING | Facility: CLINIC | Age: 38
End: 2023-07-28
Payer: COMMERCIAL

## 2023-07-28 ASSESSMENT — ACTIVITIES OF DAILY LIVING (ADL): DEPENDENT_IADLS:: INDEPENDENT

## 2023-07-28 NOTE — LETTER
[facility]  Patient Centered Plan of Care  About Me:        Patient Name:  Alexander Verdugo    YOB: 1985  Age:         38 year old   Harley MRN:    [id number] Telephone Information:  Home Phone [phone]   Mobile [phone]       Address:  [address] [facility] [address]  [place] [address] Email address:  No e-mail address on record      Emergency Contact(s)    Name Relationship Lgl Grd Work Phone Home Phone Mobile Phone   1YANELY MOJICA NELLY Spouse    [phone]           Primary language:  Cynthia     needed? Yes   [employer]:  [phone] op. 1  Other communication barriers:Language barrier    Preferred Method of Communication:     Current living arrangement: I live in a private home with family    Mobility Status/ Medical Equipment: Independent        Health Maintenance  Health Maintenance Reviewed: Due/Overdue       My Access Plan  Medical Emergency 911   Primary Clinic Line [facility] [phone]   24 Hour Appointment Line [phone] or  [phone]) (toll-free)   24 Hour Nurse Line [phone] (toll-free)   Preferred Urgent Care [facility] [phone]     Preferred Hospital [facility]  [phone]     Preferred Pharmacy [facility] - [place], [place] - [address] [place] [address] Pky     Behavioral Health Crisis Line The National Suicide Prevention Lifeline at 1-143.325.1781 or Text/Call 808             My Care Team Members  Patient Care Team         Relationship Specialty Notifications Start End    Marisa Collier MD PCP - General Family Medicine  4/12/22     Merged    Phone: [phone] Fax: [phone]         [address] [facility] [place] [address] [place] [address]    Mary River MD  Family Practice  9/30/21     Merged    Phone: [phone] Fax: [phone]         [address] [facility] [address] [place] [address]    Marisa Collier MD Assigned PCP   5/27/23     Phone: [phone] Fax:  [phone]          [facility] [address] [facility] [address]    Jaya Rao Community Health Worker Primary Care - CC Admissions 23     Phone: [phone] Fax: [phone]         [address] [place] [address] [address] [facility] [address]    Sonia Osei, RN Lead Care Coordinator Primary Care - CC Admissions 23               My Care Plans  Self Management and Treatment Plan  Care Plan       Action Plans on File:                       Advance Care Plans/Directives Type:   No data recorded    My Medical and Care Information  Problem List   Patient Active Problem List   Diagnosis    History of  section    , delivered    Pregnancy, unspecified gestational age      Current Medications and Allergies:  See printed Medication Report.    Care Coordination Start Date: 2023   Frequency of Care Coordination: 6 weeks     Form Last Updated: 2023

## 2023-07-28 NOTE — PROGRESS NOTES
Clinic Care Coordination Contact  Clinic Care Coordination Contact  OUTREACH    Referral Information:  Referral Source: PCP    Primary Diagnosis: Pregnancy    Chief Complaint   Patient presents with    Clinic Care Coordination - Initial     INITIAL RN ASSESSMENT      Clinic Utilization  Difficulty keeping appointments:: No  Compliance Concerns: No  No-Show Concerns: No  No PCP office visit in Past Year: No  Utilization      Hospital Admissions  0             ED Visits  0             No Show Count (past year)  5                    Current as of: 2023  1:00 PM            Clinical Concerns:  CCRN completed initial assessment with patient today via phone. Patient was referral to CCC by PCP to assist with outstanding bill. Patient states she's pregnant with her 4th child. Patient states she only need assist with outstanding bill and adding  under her insurance post delivery. EDC 2023.       Outstanding bill  - received a bill of $90s from [facility]. States she receives it a few times now. Will to pay but need assist.   - CHW to assist calling billing department next week.     Car seat  - has car seat. Don't need new one.     WIC   - established with [place]  - has follow up appt in 2023.     Med compliance   - Patient was able to verbalize how to take her meds correctly and no refill concerns.        - declined.     PHN  - not qualify with her 4th child or no major health issues.     Pain  Pain (GOAL):: No  Health Maintenance Reviewed: Due/Overdue   Clinical Pathway: None    Medication Management:  Medication review status: Medications reviewed and no changes reported per patient.           Functional Status:  Dependent ADLs:: Independent  Dependent IADLs:: Independent  Bed or wheelchair confined:: No  Mobility Status: Independent  Fallen 2 or more times in the past year?: No  Any fall with injury in the past year?: No    Living Situation:  Current living arrangement:: I live in a private home  with family  Type of residence:: Apartment    Lifestyle & Psychosocial Needs:    Social Determinants of Health     Tobacco Use: Low Risk  (7/20/2023)    Patient History     Smoking Tobacco Use: Never     Smokeless Tobacco Use: Never     Passive Exposure: Never   Alcohol Use: Not At Risk (12/3/2021)    AUDIT-C     Frequency of Alcohol Consumption: Never     Average Number of Drinks: Not on file     Frequency of Binge Drinking: Never   Financial Resource Strain: High Risk (12/3/2021)    Overall Financial Resource Strain (CARDIA)     Difficulty of Paying Living Expenses: Hard   Food Insecurity: No Food Insecurity (12/3/2021)    Hunger Vital Sign     Worried About Running Out of Food in the Last Year: Never true     Ran Out of Food in the Last Year: Never true   Transportation Needs: No Transportation Needs (12/3/2021)    PRAPARE - Transportation     Lack of Transportation (Medical): No     Lack of Transportation (Non-Medical): No   Physical Activity: Not on file   Stress: Not on file   Social Connections: Not on file   Intimate Partner Violence: Not At Risk (12/3/2021)    Humiliation, Afraid, Rape, and Kick questionnaire     Fear of Current or Ex-Partner: No     Emotionally Abused: No     Physically Abused: No     Sexually Abused: No   Depression: Not at risk (5/4/2023)    PHQ-2     PHQ-2 Score: 0   Housing Stability: Not on file     Diet:: Regular  Inadequate nutrition (GOAL):: No  Tube Feeding: No  Inadequate activity/exercise (GOAL):: Yes  Significant changes in sleep pattern (GOAL): No  Transportation means:: Medical transport     Yarsanism or spiritual beliefs that impact treatment:: No  Mental health DX:: No  Mental health management concern (GOAL):: No  Chemical Dependency Status: No Current Concerns  Informal Support system:: Family, Children, Meghan based, Friends, Spouse      Resources and Interventions:  Current Resources:      Community Resources: County Programs, County Worker  Supplies Currently Used at  Home: None  Equipment Currently Used at Home: none  Employment Status: unemployed     Advance Care Plan/Directive  Advanced Care Plans/Directives on file:: No  Advanced Care Plan/Directive Status: Declined Further Information    Referrals Placed: None     Care Plan:  N/a    Outreach Frequency: 6 weeks  Future Appointments                Today SPRO CCC RN [facility] [place]    In 2 weeks Marisa Collier MD [facility] [place]    In 1 month Marisa Collier MD [facility] [place]    In 2 months Marisa Collier MD [facility] [place]    In 2 months Marisa Collier MD [facility] [place]    In 3 months Marisa Collier MD [facility] [place]    In 3 months Marisa Collier MD [facility]    In 3 months Marisa Collier MD [facility] [place]    In 3 months Marisa Collier MD [facility] [place]    In 4 months Marisa Collier MD [facility] [place]    In 4 months Marisa Collier MD [facility] [place]            Plan: CHW to assist with outstanding bill then patient can be graduated. Patient will notify PCP or CCC post delivery to add new born under her insurance in November, 2023.

## 2023-08-01 ENCOUNTER — PATIENT OUTREACH (OUTPATIENT)
Dept: CARE COORDINATION | Facility: CLINIC | Age: 38
End: 2023-08-01
Payer: COMMERCIAL

## 2023-08-01 NOTE — TELEPHONE ENCOUNTER
Called [facility] billing department at [phone] and left a voice message to call back to assist patient with outstanding balance that patient received.

## 2023-08-01 NOTE — TELEPHONE ENCOUNTER
Per my initial assessment:   Plan: CHW to assist with outstanding bill then patient can be graduated. Patient will notify PCP or CCC post delivery to add new born under her insurance in November, 2023.

## 2023-08-17 ENCOUNTER — PRENATAL OFFICE VISIT (OUTPATIENT)
Dept: FAMILY MEDICINE | Facility: CLINIC | Age: 38
End: 2023-08-17
Payer: COMMERCIAL

## 2023-08-17 VITALS
RESPIRATION RATE: 16 BRPM | OXYGEN SATURATION: 99 % | HEIGHT: 60 IN | WEIGHT: 136 LBS | HEART RATE: 82 BPM | BODY MASS INDEX: 26.7 KG/M2 | TEMPERATURE: 98.1 F | SYSTOLIC BLOOD PRESSURE: 100 MMHG | DIASTOLIC BLOOD PRESSURE: 64 MMHG

## 2023-08-17 DIAGNOSIS — Z98.891 HISTORY OF CESAREAN SECTION: ICD-10-CM

## 2023-08-17 DIAGNOSIS — Z34.90 PREGNANCY, UNSPECIFIED GESTATIONAL AGE: Primary | ICD-10-CM

## 2023-08-17 LAB
GLUCOSE 1H P 50 G GLC PO SERPL-MCNC: 89 MG/DL (ref 70–129)
HGB BLD-MCNC: 11.3 G/DL (ref 11.7–15.7)

## 2023-08-17 PROCEDURE — 82950 GLUCOSE TEST: CPT | Performed by: FAMILY MEDICINE

## 2023-08-17 PROCEDURE — 85018 HEMOGLOBIN: CPT | Performed by: FAMILY MEDICINE

## 2023-08-17 PROCEDURE — 36415 COLL VENOUS BLD VENIPUNCTURE: CPT | Performed by: FAMILY MEDICINE

## 2023-08-17 PROCEDURE — 86780 TREPONEMA PALLIDUM: CPT | Performed by: FAMILY MEDICINE

## 2023-08-17 PROCEDURE — 99207 PR PRENATAL VISIT: CPT | Performed by: FAMILY MEDICINE

## 2023-08-17 NOTE — PROGRESS NOTES
"SUBJECTIVE:   at 25w3d. Estimated Date of Delivery: 2023.  She is doing well. She denies nausea and vomiting. She denies abdominal pain/cramping, vaginal bleeding, leakage of fluid. Fetal movement is present.   Concerns: none  ROS  Negative except as noted above in HPI.  OBJECTIVE:  Blood pressure 100/64, pulse 82, temperature 98.1  F (36.7  C), temperature source Oral, resp. rate 16, height 1.53 m (5' 0.25\"), weight 61.7 kg (136 lb), last menstrual period 2023, SpO2 99 %, currently breastfeeding.  Gen: comfortable, no acute distress.  See OB Vitals flowsheet.  ASSESSMENT/PLAN:  Diagnoses and all orders for this visit:    Pregnancy, unspecified gestational age  -     Glucose tolerance, gest screen, 1 hour; Future  -     Treponema Abs w Reflex to RPR and Titer; Future  -     Hemoglobin; Future      -IUP at 25w3d:   Prenatal labs reviewed   GTT completed today  RTC in 4 weeks or sooner with problems. Refer to OB-GYN for TOLAC consult next appointment.    Visit completed along with assistance of Cynthia .    Marisa Collier MD    "

## 2023-08-18 LAB — T PALLIDUM AB SER QL: NONREACTIVE

## 2023-09-08 ENCOUNTER — PATIENT OUTREACH (OUTPATIENT)
Dept: CARE COORDINATION | Facility: CLINIC | Age: 38
End: 2023-09-08
Payer: COMMERCIAL

## 2023-09-08 NOTE — PROGRESS NOTES
Clinic Care Coordination Contact  Follow Up Progress Note      Assessment: CCRN spoke with patient today to follow up on outstanding bill. Patient states [place] didn't assist her with the bill as told but she met with PCP on 8/17/23 and she wrote a check and mailed it back to [facility]. Instructed patient to bring the bill the clinic if she receives it again and ask to speak to CCRN directly. Patient verbalized correctly.     Plan: No further assist needed from CCC. Instructed patient to ask PCP to referral her back to CCC post delivery if she needs help adding new born under her insurance.

## 2023-09-18 ENCOUNTER — PRENATAL OFFICE VISIT (OUTPATIENT)
Dept: FAMILY MEDICINE | Facility: CLINIC | Age: 38
End: 2023-09-18
Payer: COMMERCIAL

## 2023-09-18 VITALS
RESPIRATION RATE: 18 BRPM | OXYGEN SATURATION: 99 % | BODY MASS INDEX: 26.76 KG/M2 | HEIGHT: 61 IN | DIASTOLIC BLOOD PRESSURE: 60 MMHG | HEART RATE: 90 BPM | TEMPERATURE: 97.6 F | SYSTOLIC BLOOD PRESSURE: 95 MMHG | WEIGHT: 141.75 LBS

## 2023-09-18 DIAGNOSIS — Z32.01 PREGNANCY TEST POSITIVE: ICD-10-CM

## 2023-09-18 DIAGNOSIS — Z34.90 PREGNANCY, UNSPECIFIED GESTATIONAL AGE: Primary | ICD-10-CM

## 2023-09-18 DIAGNOSIS — Z98.891 HISTORY OF CESAREAN DELIVERY: ICD-10-CM

## 2023-09-18 PROCEDURE — 90715 TDAP VACCINE 7 YRS/> IM: CPT | Performed by: FAMILY MEDICINE

## 2023-09-18 PROCEDURE — 90471 IMMUNIZATION ADMIN: CPT | Performed by: FAMILY MEDICINE

## 2023-09-18 PROCEDURE — 99207 PR PRENATAL VISIT: CPT | Performed by: FAMILY MEDICINE

## 2023-09-18 RX ORDER — PRENATAL VIT/IRON FUM/FOLIC AC 27MG-0.8MG
1 TABLET ORAL DAILY
Qty: 90 TABLET | Refills: 3 | Status: SHIPPED | OUTPATIENT
Start: 2023-09-18 | End: 2024-01-04

## 2023-09-18 NOTE — PROGRESS NOTES
"SUBJECTIVE:   at 30w0d. Estimated Date of Delivery: 2023.  She is doing well. She denies vaginal bleeding, leakage of fluid, or urinary symptoms. Fetal movement is present. She is not having contractions.  Concerns: none- taking prenatal vitamin but needs refill  ROS  Negative except as noted above in HPI  OBJECTIVE:  Blood pressure 95/60, pulse 90, temperature 97.6  F (36.4  C), temperature source Oral, resp. rate 18, height 1.541 m (5' 0.67\"), weight 64.3 kg (141 lb 12 oz), last menstrual period 2023, SpO2 99 %, currently breastfeeding.  Gen: Comfortable, no acute distress.  Abd: Gravid, non-tender  See OB Vitals flowsheet.  ASSESSMENT/PLAN:  Alexander was seen today for prenatal care.    Diagnoses and all orders for this visit:    Pregnancy test positive  -     Prenatal Vit-Fe Fumarate-FA (PRENATAL MULTIVITAMIN W/IRON) 27-0.8 MG tablet; Take 1 tablet by mouth daily      -IUP at 30w0d:   Prenatal labs reviewed   History of  section- desires TOLAC- sent referral today  RTC in 2 weeks or sooner with problems. Bedside US next visit    Visit completed along with assistance of Cynthia .    Marisa Collier MD    "

## 2023-10-05 ENCOUNTER — PRENATAL OFFICE VISIT (OUTPATIENT)
Dept: FAMILY MEDICINE | Facility: CLINIC | Age: 38
End: 2023-10-05
Payer: COMMERCIAL

## 2023-10-05 VITALS
HEART RATE: 73 BPM | WEIGHT: 143.01 LBS | SYSTOLIC BLOOD PRESSURE: 98 MMHG | OXYGEN SATURATION: 98 % | HEIGHT: 61 IN | RESPIRATION RATE: 18 BRPM | BODY MASS INDEX: 27 KG/M2 | TEMPERATURE: 97.4 F | DIASTOLIC BLOOD PRESSURE: 64 MMHG

## 2023-10-05 DIAGNOSIS — Z34.90 PREGNANCY, UNSPECIFIED GESTATIONAL AGE: Primary | ICD-10-CM

## 2023-10-05 DIAGNOSIS — Z98.891 HISTORY OF CESAREAN SECTION: ICD-10-CM

## 2023-10-05 PROCEDURE — 90471 IMMUNIZATION ADMIN: CPT | Performed by: FAMILY MEDICINE

## 2023-10-05 PROCEDURE — 90686 IIV4 VACC NO PRSV 0.5 ML IM: CPT | Performed by: FAMILY MEDICINE

## 2023-10-05 PROCEDURE — 99207 PR PRENATAL VISIT: CPT | Performed by: FAMILY MEDICINE

## 2023-10-05 NOTE — PROGRESS NOTES
"SUBJECTIVE:   at 32w3d. Estimated Date of Delivery: 2023.  She is doing well. She denies vaginal bleeding, leakage of fluid, or urinary symptoms. Fetal movement is present. She is not having contractions.  Concerns: no concerns- she was scheduled for [facility] TOLAC consent but they did not help with transportation and did not give reminder for appointment, so she has not yet had this.  ROS  Negative except as noted above in HPI  OBJECTIVE:  Blood pressure 98/64, pulse 73, temperature 97.4  F (36.3  C), resp. rate 18, height 1.55 m (5' 1.02\"), weight 64.9 kg (143 lb 0.2 oz), last menstrual period 2023, SpO2 98 %, currently breastfeeding.  Gen: Comfortable, no acute distress.  Abd: Gravid, non-tender  See OB Vitals flowsheet  ASSESSMENT/PLAN:  Mu was seen today for prenatal care.    Diagnoses and all orders for this visit:    Pregnancy, unspecified gestational age    History of  section: Needs TOLAC consent- was scheduled but transportation was not set up by [facility] and patient did not know how to set this up- will send to specialty to reschedule.    Other orders  -     INFLUENZA VACCINE >6 MONTHS (AFLURIA/FLUZONE)      -IUP at 32w3d:   Prenatal labs reviewed   RTC in 2 weeks or sooner with problems.    Visit completed along with assistance of Cynthia .    Marisa Collier MD    "

## 2023-10-25 ENCOUNTER — PRENATAL OFFICE VISIT (OUTPATIENT)
Dept: FAMILY MEDICINE | Facility: CLINIC | Age: 38
End: 2023-10-25
Payer: COMMERCIAL

## 2023-10-25 VITALS
TEMPERATURE: 99 F | SYSTOLIC BLOOD PRESSURE: 100 MMHG | BODY MASS INDEX: 27.66 KG/M2 | OXYGEN SATURATION: 98 % | RESPIRATION RATE: 16 BRPM | WEIGHT: 146.5 LBS | DIASTOLIC BLOOD PRESSURE: 62 MMHG | HEIGHT: 61 IN | HEART RATE: 82 BPM

## 2023-10-25 DIAGNOSIS — Z34.90 PREGNANCY, UNSPECIFIED GESTATIONAL AGE: Primary | ICD-10-CM

## 2023-10-25 PROCEDURE — 87653 STREP B DNA AMP PROBE: CPT | Performed by: FAMILY MEDICINE

## 2023-10-25 PROCEDURE — 99207 PR PRENATAL VISIT: CPT | Performed by: FAMILY MEDICINE

## 2023-10-25 NOTE — PROGRESS NOTES
"SUBJECTIVE:   at 35w2d. Estimated Date of Delivery: 2023.  She is doing well. She denies vaginal bleeding, leakage of fluid, or urinary symptoms. Fetal movement is present. She is having mild tightening at night.  Concerns: [facility]  ROS  Negative except as noted above in HPI  OBJECTIVE:  Blood pressure 100/62, pulse 82, temperature 99  F (37.2  C), temperature source Oral, resp. rate 16, height 1.54 m (5' 0.63\"), weight 66.5 kg (146 lb 8 oz), last menstrual period 2023, SpO2 98%, currently breastfeeding.  Gen: Comfortable, no acute distress.  Abd: Gravid, non-tender  See OB Vitals flowsheet.  ASSESSMENT/PLAN:  Mu was seen today for prenatal care.    Diagnoses and all orders for this visit:    Pregnancy, unspecified gestational age  -     Group B strep PCR    History of : Has TOLAC consent appointment at [facility] 10/27/23    -IUP at 35w2d:   Prenatal labs reviewed   Reviewed how and when to go to hospital.  RTC in 1 weeks or sooner with problems. Hospital form next visit    Visit completed along with assistance of Cynthia .    Marisa Collier MD    "

## 2023-10-26 LAB — GP B STREP DNA SPEC QL NAA+PROBE: NEGATIVE

## 2023-10-27 ENCOUNTER — TRANSFERRED RECORDS (OUTPATIENT)
Dept: HEALTH INFORMATION MANAGEMENT | Facility: CLINIC | Age: 38
End: 2023-10-27
Payer: COMMERCIAL

## 2023-11-16 ENCOUNTER — PRENATAL OFFICE VISIT (OUTPATIENT)
Dept: FAMILY MEDICINE | Facility: CLINIC | Age: 38
End: 2023-11-16
Payer: COMMERCIAL

## 2023-11-16 VITALS
WEIGHT: 153.1 LBS | RESPIRATION RATE: 20 BRPM | BODY MASS INDEX: 29.28 KG/M2 | OXYGEN SATURATION: 99 % | DIASTOLIC BLOOD PRESSURE: 68 MMHG | SYSTOLIC BLOOD PRESSURE: 100 MMHG | HEART RATE: 78 BPM | TEMPERATURE: 97.9 F

## 2023-11-16 DIAGNOSIS — Z34.90 PREGNANCY, UNSPECIFIED GESTATIONAL AGE: Primary | ICD-10-CM

## 2023-11-16 DIAGNOSIS — R30.0 DYSURIA: ICD-10-CM

## 2023-11-16 LAB
ALBUMIN UR-MCNC: NEGATIVE MG/DL
APPEARANCE UR: CLEAR
BILIRUB UR QL STRIP: NEGATIVE
COLOR UR AUTO: YELLOW
GLUCOSE UR STRIP-MCNC: NEGATIVE MG/DL
HGB UR QL STRIP: NEGATIVE
KETONES UR STRIP-MCNC: NEGATIVE MG/DL
LEUKOCYTE ESTERASE UR QL STRIP: NEGATIVE
NITRATE UR QL: NEGATIVE
PH UR STRIP: 7.5 [PH] (ref 5–7)
SP GR UR STRIP: 1.02 (ref 1–1.03)
UROBILINOGEN UR STRIP-ACNC: 0.2 E.U./DL

## 2023-11-16 PROCEDURE — 59425 ANTEPARTUM CARE ONLY: CPT | Performed by: FAMILY MEDICINE

## 2023-11-16 PROCEDURE — 81003 URINALYSIS AUTO W/O SCOPE: CPT | Performed by: FAMILY MEDICINE

## 2023-11-16 NOTE — COMMUNITY RESOURCES LIST (ENGLISH)
11/16/2023   [facility]  N/A  For questions about this resource list or additional care needs, please contact your primary care clinic or care manager.  Phone: [phone]   Email: N/A   Address: [address] [place] [address]   Hours: N/A        Transportation       Free or low-cost transportation  1  [employer] Bus Distance: 5.12 miles      In-Person   [address] [place] [place] [address]  Language: English  Hours: Tue 9:00 AM - 2:00 PM  Fees: Self Pay   Phone: (247) 563-7966 Email: info@[employer] Website: http://www.[employer].com.org/     2  [employer] Bus [employer] - Free or low-cost transportation Distance: 5.92 miles      In-Person   [address] [place] [address]  Language: English  Hours: Mon - Fri 9:00 AM - 5:00 PM  Fees: Free   Phone: (740) 362-3718 Email: info@[employer] Website: https://www.[employer]/     Transportation to medical appointments  3  [employer] - Non-Emergency Medical Transportation Distance: 3.23 miles      In-Person   [address] [place] [address]  Language: English  Hours: Mon - Fri 8:00 AM - 4:00 PM Appt. Only  Fees: Self Pay   Phone: (187) 998-1554 Website: http://www.[facility].org/Medical-Services/Emergency-medical-services/Non-emergency-transportation/     4  [employer] Ride Distance: 3.93 miles      In-Person   [address] [address]  Language: English  Hours: Mon - Thu 6:00 AM - 6:00 PM , Fri 6:00 AM - 5:00 PM  Fees: Insurance, Self Pay   Phone: (605) 171-1020 Email: office@[employer] Website: https://www.[employer]/          Important Numbers & Websites       Emergency Services   911  City Services   311  Poison Control   (548) 393-1112  Suicide Prevention Lifeline   (603) 842-1230 (TALK)  Child Abuse Hotline   (138) 158-1135 (4-A-Child)  Sexual Assault Hotline   (536) 556-2450 (HOPE)  National Runaway Safeline   (416) 484-2150  (RUNAWAY)  All-[employer] Talkline   (759) 956-5832  Substance Abuse Referral   (433) 727-9278 (HELP)

## 2023-11-16 NOTE — PROGRESS NOTES
SUBJECTIVE:   at 38w3d. Estimated Date of Delivery: 2023.  She is doing well. She denies vaginal bleeding, leakage of fluid, or urinary symptoms. Fetal movement is present. She is not having contractions.  Concerns: having some dysuria. Denies hematuria, fever, nausea, vomiting or flank pain. No vaginal discharge.  ROS  Negative except as noted above in HPI  OBJECTIVE:  Blood pressure 100/68, pulse 78, temperature 97.9  F (36.6  C), temperature source Oral, resp. rate 20, weight 69.4 kg (153 lb 1.6 oz), last menstrual period 2023, SpO2 99%, currently breastfeeding.  Gen: Comfortable, no acute distress.  Abd: Gravid, non-tender  See OB Vitals flowsheet.  ASSESSMENT/PLAN:  Alexander was seen today for prenatal care.    Diagnoses and all orders for this visit:    Pregnancy, unspecified gestational age  -     UA Macroscopic with reflex to Microscopic and Culture - Clinic Collect    Dysuria: Check UA/UC.  -     UA Macroscopic with reflex to Microscopic and Culture - Clinic Collect      -IUP at 38w3d:   Prenatal labs reviewed   TOLAC consent completed and in chart.  Reviewed plans for getting to the hospital. Gave hospital form.  RTC in 1 weeks or sooner with problems. Check cervix next visit    Visit completed along with assistance of Cynthia .    Marisa Collier MD

## 2023-11-17 ENCOUNTER — HOSPITAL ENCOUNTER (INPATIENT)
Facility: HOSPITAL | Age: 38
LOS: 2 days | Discharge: HOME HEALTH | End: 2023-11-19
Attending: FAMILY MEDICINE | Admitting: OBSTETRICS & GYNECOLOGY
Payer: COMMERCIAL

## 2023-11-17 DIAGNOSIS — O34.219 VBAC, DELIVERED: Primary | ICD-10-CM

## 2023-11-17 DIAGNOSIS — D62 ANEMIA DUE TO BLOOD LOSS, ACUTE: ICD-10-CM

## 2023-11-17 DIAGNOSIS — Z98.891 HISTORY OF CESAREAN SECTION: ICD-10-CM

## 2023-11-17 PROCEDURE — 250N000009 HC RX 250

## 2023-11-17 PROCEDURE — 120N000001 HC R&B MED SURG/OB

## 2023-11-17 PROCEDURE — 722N000001 HC LABOR CARE VAGINAL DELIVERY SINGLE

## 2023-11-17 PROCEDURE — 10907ZC DRAINAGE OF AMNIOTIC FLUID, THERAPEUTIC FROM PRODUCTS OF CONCEPTION, VIA NATURAL OR ARTIFICIAL OPENING: ICD-10-PCS | Performed by: OBSTETRICS & GYNECOLOGY

## 2023-11-17 PROCEDURE — 250N000011 HC RX IP 250 OP 636: Mod: JZ

## 2023-11-17 RX ORDER — ONDANSETRON 2 MG/ML
4 INJECTION INTRAMUSCULAR; INTRAVENOUS EVERY 6 HOURS PRN
Status: DISCONTINUED | OUTPATIENT
Start: 2023-11-17 | End: 2023-11-17 | Stop reason: HOSPADM

## 2023-11-17 RX ORDER — BISACODYL 10 MG
10 SUPPOSITORY, RECTAL RECTAL DAILY PRN
Status: DISCONTINUED | OUTPATIENT
Start: 2023-11-17 | End: 2023-11-19 | Stop reason: HOSPADM

## 2023-11-17 RX ORDER — KETOROLAC TROMETHAMINE 30 MG/ML
30 INJECTION, SOLUTION INTRAMUSCULAR; INTRAVENOUS
Status: DISCONTINUED | OUTPATIENT
Start: 2023-11-17 | End: 2023-11-19 | Stop reason: HOSPADM

## 2023-11-17 RX ORDER — METOCLOPRAMIDE 10 MG/1
10 TABLET ORAL EVERY 6 HOURS PRN
Status: DISCONTINUED | OUTPATIENT
Start: 2023-11-17 | End: 2023-11-17 | Stop reason: HOSPADM

## 2023-11-17 RX ORDER — DOCUSATE SODIUM 100 MG/1
100 CAPSULE, LIQUID FILLED ORAL DAILY
Status: DISCONTINUED | OUTPATIENT
Start: 2023-11-17 | End: 2023-11-19 | Stop reason: HOSPADM

## 2023-11-17 RX ORDER — OXYTOCIN 10 [USP'U]/ML
INJECTION, SOLUTION INTRAMUSCULAR; INTRAVENOUS
Status: DISCONTINUED
Start: 2023-11-17 | End: 2023-11-17 | Stop reason: WASHOUT

## 2023-11-17 RX ORDER — MISOPROSTOL 200 UG/1
800 TABLET ORAL
Status: DISCONTINUED | OUTPATIENT
Start: 2023-11-17 | End: 2023-11-19 | Stop reason: HOSPADM

## 2023-11-17 RX ORDER — NALOXONE HYDROCHLORIDE 0.4 MG/ML
0.2 INJECTION, SOLUTION INTRAMUSCULAR; INTRAVENOUS; SUBCUTANEOUS
Status: DISCONTINUED | OUTPATIENT
Start: 2023-11-17 | End: 2023-11-17 | Stop reason: HOSPADM

## 2023-11-17 RX ORDER — OXYTOCIN/0.9 % SODIUM CHLORIDE 30/500 ML
PLASTIC BAG, INJECTION (ML) INTRAVENOUS
Status: COMPLETED
Start: 2023-11-17 | End: 2023-11-17

## 2023-11-17 RX ORDER — OXYTOCIN 10 [USP'U]/ML
10 INJECTION, SOLUTION INTRAMUSCULAR; INTRAVENOUS
Status: DISCONTINUED | OUTPATIENT
Start: 2023-11-17 | End: 2023-11-17 | Stop reason: HOSPADM

## 2023-11-17 RX ORDER — MODIFIED LANOLIN
OINTMENT (GRAM) TOPICAL
Status: DISCONTINUED | OUTPATIENT
Start: 2023-11-17 | End: 2023-11-19 | Stop reason: HOSPADM

## 2023-11-17 RX ORDER — HYDROCORTISONE 25 MG/G
CREAM TOPICAL 3 TIMES DAILY PRN
Status: DISCONTINUED | OUTPATIENT
Start: 2023-11-17 | End: 2023-11-19 | Stop reason: HOSPADM

## 2023-11-17 RX ORDER — LIDOCAINE 40 MG/G
CREAM TOPICAL
Status: DISCONTINUED | OUTPATIENT
Start: 2023-11-17 | End: 2023-11-17 | Stop reason: HOSPADM

## 2023-11-17 RX ORDER — MISOPROSTOL 200 UG/1
400 TABLET ORAL
Status: DISCONTINUED | OUTPATIENT
Start: 2023-11-17 | End: 2023-11-19 | Stop reason: HOSPADM

## 2023-11-17 RX ORDER — PROCHLORPERAZINE MALEATE 10 MG
10 TABLET ORAL EVERY 6 HOURS PRN
Status: DISCONTINUED | OUTPATIENT
Start: 2023-11-17 | End: 2023-11-17 | Stop reason: HOSPADM

## 2023-11-17 RX ORDER — KETOROLAC TROMETHAMINE 30 MG/ML
INJECTION, SOLUTION INTRAMUSCULAR; INTRAVENOUS
Status: COMPLETED
Start: 2023-11-17 | End: 2023-11-17

## 2023-11-17 RX ORDER — OXYTOCIN/0.9 % SODIUM CHLORIDE 30/500 ML
340 PLASTIC BAG, INJECTION (ML) INTRAVENOUS CONTINUOUS PRN
Status: DISCONTINUED | OUTPATIENT
Start: 2023-11-17 | End: 2023-11-19 | Stop reason: HOSPADM

## 2023-11-17 RX ORDER — NALOXONE HYDROCHLORIDE 0.4 MG/ML
0.4 INJECTION, SOLUTION INTRAMUSCULAR; INTRAVENOUS; SUBCUTANEOUS
Status: DISCONTINUED | OUTPATIENT
Start: 2023-11-17 | End: 2023-11-17 | Stop reason: HOSPADM

## 2023-11-17 RX ORDER — METOCLOPRAMIDE HYDROCHLORIDE 5 MG/ML
10 INJECTION INTRAMUSCULAR; INTRAVENOUS EVERY 6 HOURS PRN
Status: DISCONTINUED | OUTPATIENT
Start: 2023-11-17 | End: 2023-11-17 | Stop reason: HOSPADM

## 2023-11-17 RX ORDER — LIDOCAINE HYDROCHLORIDE 10 MG/ML
INJECTION, SOLUTION EPIDURAL; INFILTRATION; INTRACAUDAL; PERINEURAL
Status: DISCONTINUED
Start: 2023-11-17 | End: 2023-11-17 | Stop reason: WASHOUT

## 2023-11-17 RX ORDER — CARBOPROST TROMETHAMINE 250 UG/ML
250 INJECTION, SOLUTION INTRAMUSCULAR
Status: DISCONTINUED | OUTPATIENT
Start: 2023-11-17 | End: 2023-11-17 | Stop reason: HOSPADM

## 2023-11-17 RX ORDER — OXYTOCIN 10 [USP'U]/ML
10 INJECTION, SOLUTION INTRAMUSCULAR; INTRAVENOUS
Status: DISCONTINUED | OUTPATIENT
Start: 2023-11-17 | End: 2023-11-19 | Stop reason: HOSPADM

## 2023-11-17 RX ORDER — ACETAMINOPHEN 325 MG/1
650 TABLET ORAL EVERY 4 HOURS PRN
Status: DISCONTINUED | OUTPATIENT
Start: 2023-11-17 | End: 2023-11-19 | Stop reason: HOSPADM

## 2023-11-17 RX ORDER — ONDANSETRON 4 MG/1
4 TABLET, ORALLY DISINTEGRATING ORAL EVERY 6 HOURS PRN
Status: DISCONTINUED | OUTPATIENT
Start: 2023-11-17 | End: 2023-11-17 | Stop reason: HOSPADM

## 2023-11-17 RX ORDER — OXYTOCIN/0.9 % SODIUM CHLORIDE 30/500 ML
100-340 PLASTIC BAG, INJECTION (ML) INTRAVENOUS CONTINUOUS PRN
Status: DISCONTINUED | OUTPATIENT
Start: 2023-11-17 | End: 2023-11-19 | Stop reason: HOSPADM

## 2023-11-17 RX ORDER — SODIUM CHLORIDE, SODIUM LACTATE, POTASSIUM CHLORIDE, CALCIUM CHLORIDE 600; 310; 30; 20 MG/100ML; MG/100ML; MG/100ML; MG/100ML
INJECTION, SOLUTION INTRAVENOUS CONTINUOUS
Status: DISCONTINUED | OUTPATIENT
Start: 2023-11-17 | End: 2023-11-17 | Stop reason: HOSPADM

## 2023-11-17 RX ORDER — METHYLERGONOVINE MALEATE 0.2 MG/ML
200 INJECTION INTRAVENOUS
Status: DISCONTINUED | OUTPATIENT
Start: 2023-11-17 | End: 2023-11-17 | Stop reason: HOSPADM

## 2023-11-17 RX ORDER — CARBOPROST TROMETHAMINE 250 UG/ML
250 INJECTION, SOLUTION INTRAMUSCULAR
Status: DISCONTINUED | OUTPATIENT
Start: 2023-11-17 | End: 2023-11-19 | Stop reason: HOSPADM

## 2023-11-17 RX ORDER — FENTANYL CITRATE 50 UG/ML
50 INJECTION, SOLUTION INTRAMUSCULAR; INTRAVENOUS EVERY 30 MIN PRN
Status: DISCONTINUED | OUTPATIENT
Start: 2023-11-17 | End: 2023-11-17 | Stop reason: HOSPADM

## 2023-11-17 RX ORDER — TRANEXAMIC ACID 10 MG/ML
1 INJECTION, SOLUTION INTRAVENOUS EVERY 30 MIN PRN
Status: DISCONTINUED | OUTPATIENT
Start: 2023-11-17 | End: 2023-11-17 | Stop reason: HOSPADM

## 2023-11-17 RX ORDER — MISOPROSTOL 200 UG/1
TABLET ORAL
Status: DISCONTINUED
Start: 2023-11-17 | End: 2023-11-17 | Stop reason: WASHOUT

## 2023-11-17 RX ORDER — MISOPROSTOL 200 UG/1
800 TABLET ORAL
Status: DISCONTINUED | OUTPATIENT
Start: 2023-11-17 | End: 2023-11-17 | Stop reason: HOSPADM

## 2023-11-17 RX ORDER — METHYLERGONOVINE MALEATE 0.2 MG/ML
200 INJECTION INTRAVENOUS
Status: DISCONTINUED | OUTPATIENT
Start: 2023-11-17 | End: 2023-11-19 | Stop reason: HOSPADM

## 2023-11-17 RX ORDER — IBUPROFEN 800 MG/1
800 TABLET, FILM COATED ORAL
Status: DISCONTINUED | OUTPATIENT
Start: 2023-11-17 | End: 2023-11-19 | Stop reason: HOSPADM

## 2023-11-17 RX ORDER — MISOPROSTOL 200 UG/1
400 TABLET ORAL
Status: DISCONTINUED | OUTPATIENT
Start: 2023-11-17 | End: 2023-11-17 | Stop reason: HOSPADM

## 2023-11-17 RX ORDER — ACETAMINOPHEN 325 MG/1
650 TABLET ORAL EVERY 4 HOURS PRN
Status: DISCONTINUED | OUTPATIENT
Start: 2023-11-17 | End: 2023-11-17 | Stop reason: HOSPADM

## 2023-11-17 RX ORDER — TRANEXAMIC ACID 10 MG/ML
1 INJECTION, SOLUTION INTRAVENOUS EVERY 30 MIN PRN
Status: DISCONTINUED | OUTPATIENT
Start: 2023-11-17 | End: 2023-11-19 | Stop reason: HOSPADM

## 2023-11-17 RX ORDER — PROCHLORPERAZINE 25 MG
25 SUPPOSITORY, RECTAL RECTAL EVERY 12 HOURS PRN
Status: DISCONTINUED | OUTPATIENT
Start: 2023-11-17 | End: 2023-11-17 | Stop reason: HOSPADM

## 2023-11-17 RX ORDER — METHYLERGONOVINE MALEATE 0.2 MG/ML
INJECTION INTRAVENOUS
Status: COMPLETED
Start: 2023-11-17 | End: 2023-11-17

## 2023-11-17 RX ORDER — OXYTOCIN/0.9 % SODIUM CHLORIDE 30/500 ML
340 PLASTIC BAG, INJECTION (ML) INTRAVENOUS CONTINUOUS PRN
Status: DISCONTINUED | OUTPATIENT
Start: 2023-11-17 | End: 2023-11-17 | Stop reason: HOSPADM

## 2023-11-17 RX ORDER — IBUPROFEN 800 MG/1
800 TABLET, FILM COATED ORAL EVERY 6 HOURS PRN
Status: DISCONTINUED | OUTPATIENT
Start: 2023-11-17 | End: 2023-11-19 | Stop reason: HOSPADM

## 2023-11-17 RX ORDER — CITRIC ACID/SODIUM CITRATE 334-500MG
30 SOLUTION, ORAL ORAL
Status: DISCONTINUED | OUTPATIENT
Start: 2023-11-17 | End: 2023-11-17 | Stop reason: HOSPADM

## 2023-11-17 RX ADMIN — METHYLERGONOVINE MALEATE 200 MCG: 0.2 INJECTION INTRAVENOUS at 17:45

## 2023-11-17 RX ADMIN — Medication 30 UNITS: at 17:45

## 2023-11-17 RX ADMIN — KETOROLAC TROMETHAMINE 30 MG: 30 INJECTION, SOLUTION INTRAMUSCULAR; INTRAVENOUS at 18:24

## 2023-11-17 ASSESSMENT — ACTIVITIES OF DAILY LIVING (ADL)
ADLS_ACUITY_SCORE: 18

## 2023-11-18 PROCEDURE — 120N000001 HC R&B MED SURG/OB

## 2023-11-18 PROCEDURE — 250N000013 HC RX MED GY IP 250 OP 250 PS 637: Performed by: OBSTETRICS & GYNECOLOGY

## 2023-11-18 PROCEDURE — 99231 SBSQ HOSP IP/OBS SF/LOW 25: CPT | Performed by: FAMILY MEDICINE

## 2023-11-18 RX ADMIN — DOCUSATE SODIUM 100 MG: 100 CAPSULE, LIQUID FILLED ORAL at 08:45

## 2023-11-18 RX ADMIN — IBUPROFEN 800 MG: 800 TABLET ORAL at 00:29

## 2023-11-18 RX ADMIN — WITCH HAZEL: 500 SOLUTION RECTAL; TOPICAL at 16:34

## 2023-11-18 RX ADMIN — ACETAMINOPHEN 650 MG: 325 TABLET ORAL at 08:45

## 2023-11-18 RX ADMIN — BENZOCAINE AND LEVOMENTHOL: 200; 5 SPRAY TOPICAL at 16:34

## 2023-11-18 ASSESSMENT — ACTIVITIES OF DAILY LIVING (ADL)
ADLS_ACUITY_SCORE: 18

## 2023-11-18 NOTE — PLAN OF CARE
Problem: Postpartum (Vaginal Delivery)  Goal: Optimal Pain Control and Function  Outcome: Progressing  Intervention: Prevent or Manage Pain  Recent Flowsheet Documentation  Taken 11/17/2023 2006 by Love Larkin, RN  Perineal Care:   perineum cleansed   absorbent brief/pad changed     Problem: Postpartum (Vaginal Delivery)  Goal: Hemostasis  Outcome: Progressing   Goal Outcome Evaluation:      VSS postpartum. Fundus firm, midline. Small amount of rubra lochia present on susi pad. Pt is voiding without difficulty. Pt states pain is well controlled with PRN ibuprofen. Will continue to monitor.

## 2023-11-18 NOTE — PROGRESS NOTES
[facility]    Post-Partum Progress Note    Assessment & Plan   Assessment:  Post-partum day #1    Precipitous delivery  Iron deficiency anemia    Plan:    Doing well.  No excessive bleeding  Pain well-controlled.  Ambulation encouraged  Anticipate discharge tomorrow    Evelio Mitchell MD     Interval History   Doing well.  Pain is adequately controlled.  No fevers.  No history of foul-smelling vaginal discharge.  Good appetite.  Denies chest pain, shortness of breath, nausea or vomiting.  Vaginal bleeding is similar to a heavy menstrual flow.  Bottle feeding for now.     Medications    - MEDICATION INSTRUCTIONS -      - MEDICATION INSTRUCTIONS -      oxytocin in 0.9% NaCl      oxytocin        docusate sodium  100 mg Oral Daily       Physical Exam   Temp: 97.8  F (36.6  C) Temp src: Oral BP: 101/72 Pulse: 65   Resp: 16 SpO2: 98 % O2 Device: None (Room air)    There were no vitals filed for this visit.  Vital Signs with Ranges  Temp:  [97.6  F (36.4  C)-98.8  F (37.1  C)] 97.8  F (36.6  C)  Pulse:  [61-71] 65  Resp:  [16-18] 16  BP: ()/(67-81) 101/72  SpO2:  [97 %-100 %] 98 %  I/O last 3 completed shifts:  In: -   Out: 988 [Urine:500; Blood:488]    Uterine fundus is firm, non-tender and at the level of the umbilicus    Data   Recent Labs   Lab Test 23  1009   AS Negative     Recent Labs   Lab Test 23  1304 23  1009   HGB 11.3* 12.6     Recent Labs   Lab Test 23  1009   RUQIGG Positive

## 2023-11-18 NOTE — DISCHARGE INSTRUCTIONS
Postpartum Vaginal Delivery Instructions    Activity     Ask family and friends for help when you need it.  Do not place anything in your vagina for 6 weeks.  You are not restricted on other activities, but take it easy for a few weeks to allow your body to recover from delivery.  You are able to do any activities you feel up to that point.  No driving until you have stopped taking your pain medications (usually two weeks after delivery).     Call your health care provider if you have any of these symptoms:     Increased pain, swelling, redness, or fluid around your stiches from an episiotomy or perineal tear.  A fever above 100.4 F (38 C) with or without chills when placing a thermometer under your tongue.  You soak a sanitary pad with blood within 1 hour, or you see blood clots larger than a golf ball.  Bleeding that lasts more than 6 weeks.  Vaginal discharge that smells bad.  Severe pain, cramping or tenderness in your lower belly area.  A need to urinate more frequently (use the toilet more often), more urgently (use the toilet very quickly), or it burns when you urinate.  Nausea and vomiting.  Redness, swelling or pain around a vein in your leg.  Problems breastfeeding or a red or painful area on your breast.  Chest pain and cough or are gasping for air.  Problems coping with sadness, anxiety, or depression.  If you have any concerns about hurting yourself or the baby, call your provider immediately.   You have questions or concerns after you return home.     Keep your hands clean:  Always wash your hands before touching your perineal area and stitches.  This helps reduce your risk of infection.  If your hands aren't dirty, you may use an alcohol hand-rub to clean your hands. Keep your nails clean and short.      Warning Signs after Having a Baby    Keep this paper on your fridge or somewhere else where you can see it.    Call your provider if you have any of these symptoms up to 12 weeks after having your  baby.    Thoughts of hurting yourself or your baby  Pain in your chest or trouble breathing  Severe headache not helped by pain medicine  Eyesight concerns (blurry vision, seeing spots or flashes of light, other changes to eyesight)  Fainting, shaking or other signs of a seizure    Call 9-1-1 if you feel that it is an emergency.     The symptoms below can happen to anyone after giving birth. They can be very serious. Call your provider if you have any of these warning signs.    My provider s phone number: _______________________    Losing too much blood (hemorrhage)    Call your provider if you soak through a pad in less than an hour or pass blood clots bigger than a golf ball. These may be signs that you are bleeding too much.    Blood clots in the legs or lungs    After you give birth, your body naturally clots its blood to help prevent blood loss. Sometimes this increased clotting can happen in other areas of the body, like the legs or lungs. This can block your blood flow and be very dangerous.     Call your provider if you:  Have a red, swollen spot on the back of your leg that is warm or painful when you touch it.   Are coughing up blood.     Infection    Call your provider if you have any of these symptoms:  Fever of 100.4 F (38 C) or higher.  Pain or redness around your stitches if you had an incision.   Any yellow, white, or green fluid coming from places where you had stitches or surgery.    Mood Problems (postpartum depression)    Many people feel sad or have mood changes after having a baby. But for some people, these mood swings are worse.     Call your provider right away if you feel so anxious or nervous that you can't care for yourself or your baby.    Preeclampsia (high blood pressure)    Even if you didn't have high blood pressure when you were pregnant, you are at risk for the high blood pressure disease called preeclampsia. This risk can last up to 12 weeks after giving birth.     Call your  provider if you have:   Pain on your right side under your rib cage  Sudden swelling in the hands and face    Remember: You know your body. If something doesn't feel right, get medical help.     For informational purposes only. Not to replace the advice of your health care provider. Copyright 2020 [employer]. All rights reserved. Clinically reviewed by Anisha Garcia, RNC-OB, MSN. [employer] 575954 - Rev 02/23.

## 2023-11-18 NOTE — PLAN OF CARE
Goal Outcome Evaluation:       Alexander Verdugo arrives to L&D complete, successful  at 1741. Monitoring per unit routine at this time.

## 2023-11-18 NOTE — PROGRESS NOTES
arrives to L &D uncomfortable in wheelchair, upon getting out of wheelchair pt reports need to void, voids and when standing at bedside appears to grunt like an involuntary pushing effort, assisted to bed and SVE at 1735 - complete, charge RN called and requested Dr. Dailey IHOB to bedside. EFM applied at 1735 FH audible in the 120s , pt continues pushing efforts, writer attempting to adjust EFM to obtain FH, appears to decrease to 60s then increases to 110s and return to 60s increase back to 110  in room 1739, AROM 1741,  Male infant at 1741. Apgars 8&9.

## 2023-11-18 NOTE — H&P
[facility]    History and Physical  Obstetrics and Gynecology     Date of Admission:  2023    Assessment & Plan   Alexander Verdugo is a 38 year old  at 38w4d who presents in spontaneous labor.    PLAN:   Admit - see IP orders  Anticipate     Jocelyne Dailey MD, FACOG, [facility]  2023 6:17 PM          History of Present Illness   Alexander Verdugo is a 38 year old female  at 38w4d is admitted to the [facility] in active labor. Patient had contractions starting at noon - presented to [facility] completely dilated. No LOF.     PRENATAL COURSE  Prenatal course was complicated by history of , AMA    Prenatal labs notable for: Rh positive, Rubella immune      Past Medical History    I have reviewed this patient's medical history and updated it with pertinent information if needed.   History reviewed. No pertinent past medical history.    Past Surgical History   I have reviewed this patient's surgical history and updated it with pertinent information if needed.  Past Surgical History:   Procedure Laterality Date    C/SECTION, LOW TRANSVERSE N/A 2015    in [place]     SECTION  2015    [place] at [facility]       Prior to Admission Medications   Prior to Admission Medications   Prescriptions Last Dose Informant Patient Reported? Taking?   Prenatal Vit-Fe Fumarate-FA (PRENATAL MULTIVITAMIN W/IRON) 27-0.8 MG tablet 2023  No Yes   Sig: Take 1 tablet by mouth daily      Facility-Administered Medications: None     Allergies   No Known Allergies    Social History   I have reviewed this patient's social history and updated it with pertinent information if needed. Alexander Verdugo  reports that she has never smoked. She has never been exposed to tobacco smoke. She has never used smokeless tobacco. She reports that she does not drink alcohol and does not use drugs.    Family History   I have reviewed this patient's family history and updated it with pertinent information if  needed.   Family History   Problem Relation Age of Onset    Diabetes No family hx of     Hypertension No family hx of        Immunization History   TDAP given this pregnancy.  Influenza vaccine given this pregnancy.    Physical Exam       BP: 123/78     Resp: 16 SpO2: 100 %      Vital Signs with Ranges  Resp:  [16] 16  BP: (119-123)/(78) 123/78  SpO2:  [98 %-100 %] 100 %    Abdomen: gravid, single vertex fetus, non-tender, EFW 8 lbs 0  Cervical Exam: 10 with bulging bag    Fetal Heart Tones: unable to assess - audibly 80-90 bpm    Constitutional: healthy, alert  Respiratory: No increased work of breathing, good air exchange.  Cardiovascular: RRR

## 2023-11-18 NOTE — PROGRESS NOTES
Patient OB checks WNL. Patient assessment completed with  by ipad. Patient birth certificate and PPMA completed with . Patient will request pain meds when she feels pain. Patient ambulating independently and voiding without concern. Will continue to monitor and update with any changes or concerns.    Kera Singh RN

## 2023-11-18 NOTE — L&D DELIVERY NOTE
Delivery Summary    Alexander Verdugo MRN# [id number]   Age: 38 year old YOB: 1985     VAGINAL DELIVERY NOTE    PRE DELIVERY DIAGNOSIS  1) Intrauterine pregnancy at 38w4d   2) History of        POST DELIVERY DIAGNOSIS  1) Intrauterine pregnancy at 38w4d   2) Delivery of a viable male.  3) Apgars: 8 and 9  4) weight:3670 g    Delivery Method/Type:       PROVIDER:   Nettie Dailey MD     ANESTHESIA: None    COMPLICATIONS: None    DESCRIPTION OF PROCEDURE  Alexander Verdugo is a 38 year old  with prenatal care with Dr. Collier who was admitted at 38w4d for spontaneous labor. Patient had been elian since noon at home. She presented to the unit just after 5 PM at complete dilation with a bulging bag to the introitus. Fetal heart tones were not able to be reliably obtained prior to delivery, but audibly was in the 80-90s. Patient had a , loose nuchal delivered through.  Delayed cord clamping performed.  No gross fetal defects were observed. There was rapid bleeding after completion of the amniotic fluid that drained out. Pitocin was administered. Placenta delivered intact with 3 vessel cord. IM Methergine was given. The perineum was then evaluated and noted to be intact. Delivery QBL (mL): 400     Nettie Dailey MD                        Lucina, Male-Alexander [[id number]]      Labor Event Times          Active labor onset date: 23 Onset time: 12:00 PM   Dilation complete date: 23 Complete time:  5:35 PM   Start pushing date/time: 2023 1735          Labor Events     labor?: No   steroids: None  Labor Type: TOLAC (Trial of Labor After ), Spontaneous     Antibiotics received during labor?: No     Rupture identifier: Sac 1  Rupture date/time: 23 1741   Rupture type: Artificial Rupture of Membranes  Fluid color: Clear  Fluid odor: Normal       Delivery/Placenta Date and Time      Delivery Date: 23 Delivery Time:  5:41 PM   Placenta Date/Time:  "2023  5:44 PM  Oxytocin given at the time of delivery: after delivery of baby  Delivering clinician: Nettie Dailey MD   Other personnel present at delivery:  Provider Role   Sherly Valentine RN Registered Nurse   Fabiana Kimble RN Registered Nurse   Keli Townsend RN Charge Nurse   Genet Rogers RN Registered Nurse   Catalina Arevalo, APRN CNP Nurse Practitioner   Marielos Casper, RN Charge Nurse             Vaginal Counts       Initial count performed by 2 team members:  Two Team Members   Serge Kimble RN         [place] Suture Needles Sponges (RETIRED) Instruments   Initial counts   5    Added to count       Relief counts       Final counts   5            Placed during labor Accounted for at the end of labor   FSE NA NA   IUPC NA NA   Cervidil NA NA           Relief count performed by 2 team members:  Two Team Members   Serge Kimble RN             Final count correct?: Yes      Pre-Birth Team Brief: Complete  Post-Birth Team Debrief: Complete       Apgars    Living status: Living   1 Minute 5 Minute 10 Minute 15 Minute 20 Minute   Skin color:        Heart rate:        Reflex irritability:        Muscle tone:        Respiratory effort:        Total:               Cord      Vessels: 3 Vessels    Cord Complications: Nuchal   Nuchal Intervention: delivered through         Nuchal cord description: loose nuchal cord         Cord Blood Disposition: Discard    Gases Sent?: No    Delayed cord clamping?: Yes    Cord Clamping Delay (seconds): 31-60 seconds            Stem cell collection?: No           [place] Resuscitation           Output in Delivery Room: Stool, Voided        Measurements      Weight: 8 lb 1.5 oz Length: 1' 8\"     Head circumference: 34.9 cm    Output in delivery room: Stool, Voided       Skin to Skin and Feeding Plan      Skin to skin initiation date/time: 1841    Skin to skin with: Mother  Skin to skin end date/time:            Labor Events and " Shoulder Dystocia    Fetal Tracing Prior to Delivery: Category 2  Shoulder dystocia present?: Neg                 Delivery (Maternal) (Provider to Complete) ([id number])    Episiotomy: None      Perineal lacerations: None    Repair suture: None  Genital tract inspection done: Pos       Blood Loss  Mother: Alexander Verdugo #[id number]     Start of Mother's Information      Delivery Blood Loss  23 1200 - 23 1807      Delivery QBL (mL) Hospital Encounter 400 mL    Total  400 mL               End of Mother's Information  Mother: Alexander Verdugo #[id number]                Delivery - Provider to Complete ([id number])    Delivering clinician: Nettie Dailey MD  Delivery Type (Choose the 1 that will go to the Birth History): , Spontaneous                         Other personnel:  Provider Role   Sherly Valentine RN Registered Nurse   Fabiana Kimble RN Registered Nurse   Keli Townsend RN Charge Nurse   Genet Rogers RN Registered Nurse   Catalina Arevalo APRN CNP Nurse Practitioner   Marielos Casper, RN Charge Nurse                    Placenta    Date/Time: 2023  5:44 PM  Removal: Spontaneous  Disposition: Hospital disposal             Anesthesia    Method: None                    Presentation and Position    Presentation: Vertex    Position: Left Occiput Anterior                     Nettie Dailey MD

## 2023-11-19 VITALS
RESPIRATION RATE: 16 BRPM | WEIGHT: 144.4 LBS | OXYGEN SATURATION: 98 % | TEMPERATURE: 98.2 F | DIASTOLIC BLOOD PRESSURE: 72 MMHG | BODY MASS INDEX: 27.62 KG/M2 | HEART RATE: 72 BPM | SYSTOLIC BLOOD PRESSURE: 114 MMHG

## 2023-11-19 LAB — HGB BLD-MCNC: 9.7 G/DL (ref 11.7–15.7)

## 2023-11-19 PROCEDURE — 250N000013 HC RX MED GY IP 250 OP 250 PS 637: Performed by: OBSTETRICS & GYNECOLOGY

## 2023-11-19 PROCEDURE — 250N000013 HC RX MED GY IP 250 OP 250 PS 637: Performed by: FAMILY MEDICINE

## 2023-11-19 PROCEDURE — 99238 HOSP IP/OBS DSCHRG MGMT 30/<: CPT | Performed by: FAMILY MEDICINE

## 2023-11-19 PROCEDURE — 36415 COLL VENOUS BLD VENIPUNCTURE: CPT | Performed by: FAMILY MEDICINE

## 2023-11-19 PROCEDURE — 85018 HEMOGLOBIN: CPT | Performed by: FAMILY MEDICINE

## 2023-11-19 RX ORDER — IBUPROFEN 800 MG/1
800 TABLET, FILM COATED ORAL EVERY 6 HOURS PRN
Qty: 30 TABLET | Refills: 0 | Status: SHIPPED | OUTPATIENT
Start: 2023-11-19 | End: 2024-01-04

## 2023-11-19 RX ORDER — FERROUS GLUCONATE 324(38)MG
324 TABLET ORAL
Qty: 30 TABLET | Refills: 0 | Status: SHIPPED | OUTPATIENT
Start: 2023-11-19 | End: 2024-01-04

## 2023-11-19 RX ORDER — FERROUS GLUCONATE 324(38)MG
324 TABLET ORAL
Status: DISCONTINUED | OUTPATIENT
Start: 2023-11-19 | End: 2023-11-19 | Stop reason: HOSPADM

## 2023-11-19 RX ORDER — DOCUSATE SODIUM 100 MG/1
100 CAPSULE, LIQUID FILLED ORAL 2 TIMES DAILY PRN
Qty: 30 CAPSULE | Refills: 0 | Status: SHIPPED | OUTPATIENT
Start: 2023-11-19 | End: 2024-01-04

## 2023-11-19 RX ADMIN — IBUPROFEN 800 MG: 800 TABLET ORAL at 00:30

## 2023-11-19 RX ADMIN — Medication: at 09:13

## 2023-11-19 RX ADMIN — DOCUSATE SODIUM 100 MG: 100 CAPSULE, LIQUID FILLED ORAL at 09:14

## 2023-11-19 RX ADMIN — FERROUS GLUCONATE 324 MG: 324 TABLET ORAL at 10:41

## 2023-11-19 RX ADMIN — IBUPROFEN 800 MG: 800 TABLET ORAL at 09:13

## 2023-11-19 ASSESSMENT — ACTIVITIES OF DAILY LIVING (ADL)
ADLS_ACUITY_SCORE: 18

## 2023-11-19 NOTE — DISCHARGE SUMMARY
"[facility]    Discharge Summary  Obstetrics    Date of Admission:  2023  Date of Discharge:  2023  Discharging Provider: Evelio Mitchell MD    Discharge Diagnoses   Patient Active Problem List   Diagnosis    History of  section    , delivered    Pregnancy, unspecified gestational age         History of Present Illness   Alexander Verdugo is a 38 year old female who presented with precipitous delivery, history of , she arrived in active labor and delivered within 20 minutes. There was no complications.     Hospital Course   The patient's hospital course was unremarkable.  She recovered as anticipated and experienced no post-delivery complications.  On discharge, her pain was well controlled. Vaginal bleeding is similar to peak menstrual flow.  Voiding without difficulty.  Ambulating well and tolerating a normal diet.  No fevers.  Bottle feeding well.  She has acute blood loss anemia, iron supplementation started. Infant is stable.  She was discharged on post-partum day #2. Home care RN visit for within three days. Follow up with PCP in six weeks.       PE  Vital signs:  Temp: 98.2  F (36.8  C) Temp src: Oral BP: 114/72 Pulse: 72   Resp: 16 SpO2: 98 % O2 Device: None (Room air)        Estimated body mass index is 29.28 kg/m  as calculated from the following:    Height as of 10/25/23: 1.54 m (5' 0.63\").    Weight as of 23: 69.4 kg (153 lb 1.6 oz).  Gen: sitting comfortably  Abd: fundus firm to umbilicus  Ext: no calf edema or tenderness  Skin: warm, dry    Post-partum hemoglobin:   Hemoglobin   Date Value Ref Range Status   2023 9.7 (L) 11.7 - 15.7 g/dL Final       [place] Depression Scale  Thoughts of Harming Self: Never  Total Score: 2      Evelio Mitchell MD    Discharge Disposition   Discharged to home   Condition at discharge: Stable    Primary Care Physician   Marisa Collier    Consultations This Hospital Stay   LACTATION IP CONSULT    Discharge Orders    "   Postpartum Home Care Referral      Activity    Activity as tolerated     Reason for your hospital stay    Maternity care     Follow Up    Follow up with provider in 6 weeks for post-delivery check     Breast pump - Manual/Electric    Breast Pump Documentation:  Manual/Electric Pump: To support adequate breast milk production and nutrition for infant.     I, the undersigned, certify that the above prescribed supplies are medically necessary for this patient and is both reasonable and necessary in reference to accepted standards of medical and necessary in reference to accepted standards of medical practice in the treatment of this patient's condition and is not prescribed as a convenience.     Diet    Resume previous diet     Discharge Medications   Current Discharge Medication List        START taking these medications    Details   docusate sodium (COLACE) 100 MG capsule Take 1 capsule (100 mg) by mouth 2 times daily as needed for constipation  Qty: 30 capsule, Refills: 0    Associated Diagnoses: , delivered; History of  section; Anemia due to blood loss, acute      ferrous gluconate (FERGON) 324 (38 Fe) MG tablet Take 1 tablet (324 mg) by mouth daily (with breakfast)  Qty: 30 tablet, Refills: 0    Associated Diagnoses: , delivered; History of  section; Anemia due to blood loss, acute      ibuprofen (ADVIL/MOTRIN) 800 MG tablet Take 1 tablet (800 mg) by mouth every 6 hours as needed for other (cramping)  Qty: 30 tablet, Refills: 0    Associated Diagnoses: , delivered; History of  section; Anemia due to blood loss, acute           CONTINUE these medications which have NOT CHANGED    Details   Prenatal Vit-Fe Fumarate-FA (PRENATAL MULTIVITAMIN W/IRON) 27-0.8 MG tablet Take 1 tablet by mouth daily  Qty: 90 tablet, Refills: 3    Associated Diagnoses: Pregnancy test positive           Allergies   No Known Allergies

## 2023-11-19 NOTE — PLAN OF CARE
Goal Outcome Evaluation: Progressing       Plan of Care Reviewed With: patient    Overall Patient Progress: improvingOverall Patient Progress: improving     services utilized with each interaction this shift. Patient's VSS. Fundus is firm at umbilicus with light lochia. Patient's up ad dre and performing self-cares independently. Frequent bladder emptying, increased activity, and PO fluids encouraged this shift. Patient's reporting perineum pain treated with tucks and dermoplast. Declining acetaminophen and ibuprofen this shift. Patient's significant other is in the room with her and infant, attentive and participating in cares.     BP 97/62 (BP Location: Right arm, Patient Position: Supine, Cuff Size: Adult Regular)   Pulse 71   Temp 98.3  F (36.8  C) (Oral)   Resp 16   LMP 02/28/2023   SpO2 98%   Breastfeeding Yes     AISLINN RIVERS RN on 11/18/2023 at 9:47 PM        Problem: Adult Inpatient Plan of Care  Goal: Optimal Comfort and Wellbeing  Outcome: Progressing     Problem: Postpartum (Vaginal Delivery)  Goal: Effective Urinary Elimination  Outcome: Progressing

## 2023-11-19 NOTE — PLAN OF CARE
Mu's VSS. Pain rated at a 7 and PRN ibuprofen was given. Fundal assessment and bleeding WNL. Tucks and dermoplast being utilized. Up and independent. Her and  are attentive to infant.  Problem: Adult Inpatient Plan of Care  Goal: Plan of Care Review  Description: The Plan of Care Review/Shift note should be completed every shift.  The Outcome Evaluation is a brief statement about your assessment that the patient is improving, declining, or no change.  This information will be displayed automatically on your shift  note.  Outcome: Progressing  Goal: Absence of Hospital-Acquired Illness or Injury  Outcome: Progressing  Intervention: Prevent Skin Injury  Recent Flowsheet Documentation  Taken 11/19/2023 0011 by Lorie Riojas RN  Body Position: position changed independently  Intervention: Prevent Infection  Recent Flowsheet Documentation  Taken 11/19/2023 0011 by Lorie Riojas RN  Infection Prevention:   environmental surveillance performed   equipment surfaces disinfected   hand hygiene promoted   rest/sleep promoted  Goal: Optimal Comfort and Wellbeing  Outcome: Progressing  Intervention: Monitor Pain and Promote Comfort  Recent Flowsheet Documentation  Taken 11/19/2023 0011 by Lorie Riojas RN  Pain Management Interventions: medication (see MAR)  Intervention: Provide Person-Centered Care  Recent Flowsheet Documentation  Taken 11/19/2023 0011 by Lorie Riojas RN  Trust Relationship/Rapport:   care explained   choices provided   questions encouraged   questions answered   thoughts/feelings acknowledged  Goal: Readiness for Transition of Care  Outcome: Progressing     Problem: Postpartum (Vaginal Delivery)  Goal: Successful Parent Role Transition  Outcome: Progressing  Goal: Hemostasis  Outcome: Progressing  Goal: Absence of Infection Signs and Symptoms  Outcome: Progressing  Goal: Optimal Pain Control and Function  Outcome: Progressing  Intervention: Prevent or Manage Pain  Recent Flowsheet  Documentation  Taken 11/19/2023 0011 by Lorie Riojas, RN  Pain Management Interventions: medication (see MAR)  Goal: Effective Urinary Elimination  Outcome: Progressing

## 2023-11-19 NOTE — PLAN OF CARE
Goal Outcome Evaluation:       Discharge instructions and warning signs reviewed with Mu and her spouse. Education completed. A Cynthia  was utilized. Mu is aware that her prescriptions are ready for  at her pharmacy. Her new breast pump was distributed prior to discharge.

## 2023-11-22 ENCOUNTER — MEDICAL CORRESPONDENCE (OUTPATIENT)
Dept: HEALTH INFORMATION MANAGEMENT | Facility: CLINIC | Age: 38
End: 2023-11-22
Payer: COMMERCIAL

## 2024-01-04 ENCOUNTER — PATIENT OUTREACH (OUTPATIENT)
Dept: CARE COORDINATION | Facility: CLINIC | Age: 39
End: 2024-01-04

## 2024-01-04 ENCOUNTER — PRENATAL OFFICE VISIT (OUTPATIENT)
Dept: FAMILY MEDICINE | Facility: CLINIC | Age: 39
End: 2024-01-04
Payer: COMMERCIAL

## 2024-01-04 VITALS
SYSTOLIC BLOOD PRESSURE: 133 MMHG | RESPIRATION RATE: 16 BRPM | WEIGHT: 129.6 LBS | OXYGEN SATURATION: 100 % | DIASTOLIC BLOOD PRESSURE: 87 MMHG | HEART RATE: 66 BPM | BODY MASS INDEX: 24.47 KG/M2 | HEIGHT: 61 IN | TEMPERATURE: 98.3 F

## 2024-01-04 DIAGNOSIS — Z30.42 DEPO-PROVERA CONTRACEPTIVE STATUS: ICD-10-CM

## 2024-01-04 DIAGNOSIS — K64.4 EXTERNAL HEMORRHOIDS: ICD-10-CM

## 2024-01-04 DIAGNOSIS — D62 ANEMIA DUE TO BLOOD LOSS, ACUTE: ICD-10-CM

## 2024-01-04 DIAGNOSIS — Z98.891 HISTORY OF CESAREAN SECTION: ICD-10-CM

## 2024-01-04 PROBLEM — Z34.90 PREGNANCY, UNSPECIFIED GESTATIONAL AGE: Status: RESOLVED | Noted: 2023-07-20 | Resolved: 2024-01-04

## 2024-01-04 LAB — HGB BLD-MCNC: 12.1 G/DL (ref 11.7–15.7)

## 2024-01-04 PROCEDURE — 85018 HEMOGLOBIN: CPT | Performed by: FAMILY MEDICINE

## 2024-01-04 PROCEDURE — 36415 COLL VENOUS BLD VENIPUNCTURE: CPT | Performed by: FAMILY MEDICINE

## 2024-01-04 PROCEDURE — 96372 THER/PROPH/DIAG INJ SC/IM: CPT | Performed by: FAMILY MEDICINE

## 2024-01-04 RX ORDER — MEDROXYPROGESTERONE ACETATE 150 MG/ML
150 INJECTION, SUSPENSION INTRAMUSCULAR
Status: DISCONTINUED | OUTPATIENT
Start: 2024-01-04 | End: 2024-03-25

## 2024-01-04 RX ORDER — HYDROCORTISONE 25 MG/G
CREAM TOPICAL 2 TIMES DAILY PRN
Qty: 30 G | Refills: 1 | Status: SHIPPED | OUTPATIENT
Start: 2024-01-04

## 2024-01-04 RX ORDER — POLYETHYLENE GLYCOL 3350 17 G/17G
1 POWDER, FOR SOLUTION ORAL DAILY
Qty: 765 G | Refills: 11 | Status: SHIPPED | OUTPATIENT
Start: 2024-01-04

## 2024-01-04 RX ORDER — DOCUSATE SODIUM 100 MG/1
100 CAPSULE, LIQUID FILLED ORAL 2 TIMES DAILY PRN
Qty: 60 CAPSULE | Refills: 3 | Status: SHIPPED | OUTPATIENT
Start: 2024-01-04

## 2024-01-04 RX ADMIN — MEDROXYPROGESTERONE ACETATE 150 MG: 150 INJECTION, SUSPENSION INTRAMUSCULAR at 13:37

## 2024-01-04 ASSESSMENT — EDINBURGH POSTNATAL DEPRESSION SCALE (EPDS)
I HAVE BEEN SO UNHAPPY THAT I HAVE BEEN CRYING: NO, NEVER
THE THOUGHT OF HARMING MYSELF HAS OCCURRED TO ME: NEVER
I HAVE BEEN SO UNHAPPY THAT I HAVE HAD DIFFICULTY SLEEPING: NOT AT ALL
THE THOUGHT OF HARMING MYSELF HAS OCCURRED TO ME: NEVER
THINGS HAVE BEEN GETTING ON TOP OF ME: NO, I HAVE BEEN COPING AS WELL AS EVER
I HAVE FELT SCARED OR PANICKY FOR NO GOOD REASON: NO, NOT AT ALL
I HAVE BLAMED MYSELF UNNECESSARILY WHEN THINGS WENT WRONG: NO, NEVER
TOTAL SCORE: 0
I HAVE BEEN ANXIOUS OR WORRIED FOR NO GOOD REASON: NO, NOT AT ALL
I HAVE FELT SAD OR MISERABLE: NO, NOT AT ALL
I HAVE LOOKED FORWARD WITH ENJOYMENT TO THINGS: AS MUCH AS I EVER DID
I HAVE FELT SAD OR MISERABLE: NO, NOT AT ALL
I HAVE LOOKED FORWARD WITH ENJOYMENT TO THINGS: AS MUCH AS I EVER DID
I HAVE BLAMED MYSELF UNNECESSARILY WHEN THINGS WENT WRONG: NO, NEVER
I HAVE BEEN ABLE TO LAUGH AND SEE THE FUNNY SIDE OF THINGS: AS MUCH AS I ALWAYS COULD
I HAVE BEEN SO UNHAPPY THAT I HAVE BEEN CRYING: NO, NEVER
I HAVE BEEN ANXIOUS OR WORRIED FOR NO GOOD REASON: NO, NOT AT ALL
I HAVE BEEN SO UNHAPPY THAT I HAVE HAD DIFFICULTY SLEEPING: NOT AT ALL
THINGS HAVE BEEN GETTING ON TOP OF ME: NO, I HAVE BEEN COPING AS WELL AS EVER
I HAVE FELT SCARED OR PANICKY FOR NO GOOD REASON: NO, NOT AT ALL
I HAVE BEEN ABLE TO LAUGH AND SEE THE FUNNY SIDE OF THINGS: AS MUCH AS I ALWAYS COULD

## 2024-01-04 NOTE — PROGRESS NOTES
Assessment/Plan:     Alexander was seen today for post partum exam.    Diagnoses and all orders for this visit:    Routine postpartum follow-up  -     Cancel: Primary Care - Care Coordination Referral; Future  -     Primary Care - Care Coordination Referral; Future    External hemorrhoids: Bowel regimen, increase water and fiber. Use topicals prn for discomfort.  -     hydrocortisone, Perianal, (HYDROCORTISONE) 2.5 % cream; Place rectally 2 times daily as needed for hemorrhoids  -     polyethylene glycol (MIRALAX) 17 GM/Dose powder; Take 17 g (1 Capful) by mouth daily  -     docusate sodium (COLACE) 100 MG capsule; Take 1 capsule (100 mg) by mouth 2 times daily as needed for constipation    Anemia due to blood loss, acute: Check hemoglobin today. Asymptomatic. No longer taking PNV or iron supplement.    Depo-Provera contraceptive status  -     medroxyPROGESTERone (DEPO-PROVERA) injection 150 mg        Anemia:  Anemia during pregnancy  Breastfeeding/Bottle feeding:  Pt is breast and formula feeding baby  Contraception:   Depo-Provera  Depression:   See [facility] Dep[facility]  Exercise:   Encouraged increasing exercise based on how she is feeling.  Healthcare maintenance:   Up to date  Immunizations:    Immunizations needed; declines today               Subjective:      Alexander Verdugo is a 39 year old female who presents for a postpartum visit.   She is postpartum following a spontaneous vaginal delivery.   I have fully reviewed the prenatal and intrapartum course.   Delivery notes below  Postpartum course has been Unremarkable.  Baby's course has been Uncomplicated.  Periods: No period. Patient's last menstrual period was 2023.   Bowel or bladder concerns: intermittent constipation  Patient has not resumed intercourse.    [place]  Depression Scale: see flowsheet    Last hgb on file:    Lab Results   Component Value Date    HGB 9.7 (L) 2023        The following portions of the patient's history were  "reviewed and updated as appropriate: allergies, current medications and problem list     OB History    Para Term  AB Living   6 4 4 0 2 4   SAB IAB Ectopic Multiple Live Births   2 0 0 0 4      # Outcome Date GA Lbr [place]/2nd Weight Sex Delivery Anes PTL Lv   6 Term 23 38w4d 05:35 / 00:06 3.67 kg (8 lb 1.5 oz) M  None N HAI      Complications: Fetal Intolerance      Name: Caio Mercado      Apgar1: 8  Apgar5: 9   5 Term 22 40w3d 03:20 / 00:14 3.78 kg (8 lb 5.3 oz) M  None N HAI      Name: ALEXANDRA,MALE-MU      Apgar1: 8  Apgar5: 9   4 SAB 10/18/17     AB, MISSED         Birth Comments: Missed AB s/p D&C   3 Term 06/23/15 39w0d  3.742 kg (8 lb 4 oz) F CS-LTranv EPI  HAI      Birth Comments: [place]      Complications: Fetal Intolerance      Name: Luis Alberto Kim   2 SAB 14              Birth Comments: Blighted ovum s/p D&C   1 Term 12/25/10    M Vag-Spont   HAI      Name: Juan Ramon Menon      Obstetric Comments   1st living child born in [facility]   2nd living child born in [place]     Information for the patient's :  Caio Verdugo [[id number]]   Caio Verdugo     Objective:      /87   Pulse 66   Temp 98.3  F (36.8  C) (Oral)   Resp 16   Ht 1.54 m (5' 0.63\")   Wt 58.8 kg (129 lb 9.6 oz)   LMP 2023   SpO2 100%   Breastfeeding Yes   BMI 24.79 kg/m      Physical Exam:  General Appearance: Alert, cooperative, no distress, appears stated age  Lungs: Clear to auscultation bilaterally, respirations unlabored  Heart: Regular rate and rhythm, S1 and S2 normal, no murmur, rub, or gallop  Abdomen: Soft, non-tender, no masses  Pelvic: Deferred by patient     Marisa Collier MD    "

## 2024-01-04 NOTE — PROGRESS NOTES
Clinic Care Coordination Contact  Community Health Worker Initial Outreach    CHW Initial Information Gathering:  Referral Source: PCP  Preferred Hospital: [facility]  [phone]  Preferred Urgent Care: [facility] [place], [phone]  Current living arrangement:: I live in a private home with family  Type of residence:: Apartment  Community Resources: None  Supplies Currently Used at Home: None  Equipment Currently Used at Home: none  Informal Support system:: Family  No PCP office visit in Past Year: No  Transportation means:: Medical transport, Accessible car, Family  CHW Additional Questions  If ED/Hospital discharge, follow-up appointment scheduled as recommended?: N/A  Medication changes made following ED/Hospital discharge?: N/A  MyChart active?: No  Patient agreeable to assistance with activating MyChart?: No    Patient accepts CC: No, patient no longer needs CCC services. Patient will be sent Care Coordination introduction letter for future reference.     Referral reasons: Has a Bill from [place] (likely from her OB visit at [facility] during pregnancy) for $349. She needs asssistance with this   Also, her son Caio Verdugo has a $2697 bill from hospital for birth. She has insurance card for him but his name is Caio Verdugo, NOT Jess, as it states on the medical bill.     Patient has $0 account balance and patient was advised to make a copy of health insurance card and send it back along with return envelope to [facility]. CCC will no longer do outreach and PCP feel free to place new referral if need(s) identify.

## 2024-03-25 ENCOUNTER — MEDICAL CORRESPONDENCE (OUTPATIENT)
Dept: HEALTH INFORMATION MANAGEMENT | Facility: CLINIC | Age: 39
End: 2024-03-25

## 2024-03-25 DIAGNOSIS — Z30.09 BIRTH CONTROL COUNSELING: Primary | ICD-10-CM

## 2024-03-25 RX ORDER — DESOGESTREL AND ETHINYL ESTRADIOL 0.15-0.03
1 KIT ORAL DAILY
Qty: 84 TABLET | Refills: 3 | Status: SHIPPED | OUTPATIENT
Start: 2024-03-25

## 2024-03-25 NOTE — PROGRESS NOTES
Patient wants to change from depo to OCPs.   Orders changed, sent to pharmacy.     Mary River MD

## 2024-06-06 ENCOUNTER — MEDICAL CORRESPONDENCE (OUTPATIENT)
Dept: HEALTH INFORMATION MANAGEMENT | Facility: CLINIC | Age: 39
End: 2024-06-06
Payer: COMMERCIAL

## 2024-10-21 ENCOUNTER — OFFICE VISIT (OUTPATIENT)
Dept: FAMILY MEDICINE | Facility: CLINIC | Age: 39
End: 2024-10-21
Payer: COMMERCIAL

## 2024-10-21 VITALS
WEIGHT: 137.44 LBS | SYSTOLIC BLOOD PRESSURE: 108 MMHG | DIASTOLIC BLOOD PRESSURE: 72 MMHG | OXYGEN SATURATION: 99 % | RESPIRATION RATE: 16 BRPM | TEMPERATURE: 97.8 F | HEART RATE: 69 BPM | BODY MASS INDEX: 26.98 KG/M2 | HEIGHT: 60 IN

## 2024-10-21 DIAGNOSIS — H57.9 ITCHY EYES: Primary | ICD-10-CM

## 2024-10-21 PROCEDURE — 90656 IIV3 VACC NO PRSV 0.5 ML IM: CPT | Performed by: FAMILY MEDICINE

## 2024-10-21 PROCEDURE — 90471 IMMUNIZATION ADMIN: CPT | Performed by: FAMILY MEDICINE

## 2024-10-21 PROCEDURE — 99213 OFFICE O/P EST LOW 20 MIN: CPT | Mod: 25 | Performed by: FAMILY MEDICINE

## 2024-10-21 RX ORDER — OLOPATADINE HYDROCHLORIDE 1 MG/ML
1 SOLUTION/ DROPS OPHTHALMIC 2 TIMES DAILY
Qty: 5 ML | Refills: 11 | Status: SHIPPED | OUTPATIENT
Start: 2024-10-21

## 2024-10-21 ASSESSMENT — ENCOUNTER SYMPTOMS: EYE PAIN: 1

## 2024-10-21 NOTE — PROGRESS NOTES
"  Assessment & Plan     Itchy eyes  Trial of patanol eye drops, but if symptoms do not resolve with this over the next few days, schedule eye exam - referral placed, and she can call for help scheduling if needed.    - olopatadine (PATANOL) 0.1 % ophthalmic solution  Dispense: 5 mL; Refill: 11  - Adult Eye  Referral        BMI  Estimated body mass index is 26.53 kg/m  as calculated from the following:    Height as of this encounter: 1.533 m (5' 0.35\").    Weight as of this encounter: 62.3 kg (137 lb 7 oz).             Subjective   Alexander is a 39 year old, presenting for the following health issues:  Eye Problem (Watery, itchy eyes)        10/21/2024    11:37 AM   Additional Questions   Roomed by Rubina MADDEN   Accompanied by child(minor)     Itchy eyes, when she rubs, they get red. She feels like the eye symptoms trigger headaches. Symptoms for about a month. Sometimes vision seems to get blurry after a lot of rubbing of her eyes, very short lived, resolves with moving eyes. Has not been using any OTC drops. Using warm water washcloth to hold over eyes for comfort.      Has not had these eye symptoms in the past. Cannot think of anything new in her environment. No known history of seasonal or environmental allergies.     Not breastfeeding                  Objective    /72   Pulse 69   Temp 97.8  F (36.6  C) (Oral)   Resp 16   Ht 1.533 m (5' 0.35\")   Wt 62.3 kg (137 lb 7 oz)   LMP 09/05/2024 (Exact Date)   SpO2 99%   Breastfeeding No   BMI 26.53 kg/m    Body mass index is 26.53 kg/m .  Physical Exam   GENERAL: alert and no distress  EYES: Eyes grossly normal to inspection, PERRL and conjunctivae and sclerae essentially normal, perhaps slightly injected, no drainage. EOMI  HENT:  mouth without ulcers or lesions  NECK: no adenopathy  RESP: lungs clear to auscultation - no rales, rhonchi or wheezes  CV: regular rate and rhythm, normal S1 S2, no S3 or S4, no murmur, click or rub, no peripheral " edema              Signed Electronically by: Brooke Caballero MD

## 2024-10-29 NOTE — PROGRESS NOTES
Clinic Care Coordination Contact  Patient has completed all goals with Clinic Care Coordination.  Please review the chart and confirm if maintenance  is approved.     yes

## 2025-05-12 ENCOUNTER — ALLIED HEALTH/NURSE VISIT (OUTPATIENT)
Dept: FAMILY MEDICINE | Facility: CLINIC | Age: 40
End: 2025-05-12
Payer: COMMERCIAL

## 2025-05-12 DIAGNOSIS — Z30.42 DEPO-PROVERA CONTRACEPTIVE STATUS: Primary | ICD-10-CM

## 2025-05-12 PROCEDURE — 96372 THER/PROPH/DIAG INJ SC/IM: CPT | Performed by: FAMILY MEDICINE

## 2025-05-12 PROCEDURE — 99207 PR NO CHARGE NURSE ONLY: CPT

## 2025-05-12 RX ADMIN — MEDROXYPROGESTERONE ACETATE 150 MG: 150 INJECTION, SUSPENSION INTRAMUSCULAR at 13:56

## 2025-05-12 NOTE — PROGRESS NOTES
Clinic Administered Medication Documentation      Depo Provera Documentation    Depo-Provera Standing Order inclusion/exclusion criteria reviewed.     Is this the initial or subsequent dose of Depo Provera? Subsequent dose - patient is within the acceptable window of time (11-15 weeks) for subsequent injection. Pregnancy test not indicated.    Patient meets: inclusion criteria     Is there an active order (written within the past 365 days, with administrations remaining, not ) in the chart? Yes.     Prior to injection, verified patient identity using patient's name and date of birth. Medication was administered. Please see MAR and medication order for additional information.     Vial/Syringe: Single dose vial. Was entire vial of medication used? Yes    Patient instructed to remain in clinic for 15 minutes and report any adverse reaction to staff immediately.  NEXT INJECTION DUE: 25 - 25    Verified that the patient has refills remaining in their prescription.   
urinary symptoms/NAUSEA/PAIN